# Patient Record
Sex: FEMALE | Race: BLACK OR AFRICAN AMERICAN | NOT HISPANIC OR LATINO | Employment: FULL TIME | URBAN - METROPOLITAN AREA
[De-identification: names, ages, dates, MRNs, and addresses within clinical notes are randomized per-mention and may not be internally consistent; named-entity substitution may affect disease eponyms.]

---

## 2022-01-27 ENCOUNTER — OFFICE VISIT (OUTPATIENT)
Dept: FAMILY MEDICINE | Facility: CLINIC | Age: 32
End: 2022-01-27
Payer: COMMERCIAL

## 2022-01-27 VITALS
DIASTOLIC BLOOD PRESSURE: 84 MMHG | BODY MASS INDEX: 31.02 KG/M2 | SYSTOLIC BLOOD PRESSURE: 114 MMHG | WEIGHT: 193 LBS | OXYGEN SATURATION: 99 % | HEART RATE: 75 BPM | HEIGHT: 66 IN

## 2022-01-27 DIAGNOSIS — E21.3 HYPERPARATHYROIDISM: ICD-10-CM

## 2022-01-27 DIAGNOSIS — D50.0 IRON DEFICIENCY ANEMIA DUE TO CHRONIC BLOOD LOSS: ICD-10-CM

## 2022-01-27 DIAGNOSIS — J45.40 MODERATE PERSISTENT ASTHMA WITHOUT COMPLICATION: Primary | ICD-10-CM

## 2022-01-27 PROBLEM — J45.20 MILD INTERMITTENT ASTHMA: Status: RESOLVED | Noted: 2022-01-27 | Resolved: 2022-01-27

## 2022-01-27 PROBLEM — D25.9 UTERINE LEIOMYOMA: Status: ACTIVE | Noted: 2022-01-27

## 2022-01-27 PROBLEM — E55.9 VITAMIN D DEFICIENCY: Status: ACTIVE | Noted: 2022-01-27

## 2022-01-27 PROBLEM — J45.20 MILD INTERMITTENT ASTHMA: Status: ACTIVE | Noted: 2022-01-27

## 2022-01-27 PROBLEM — K59.01 SLOW TRANSIT CONSTIPATION: Status: ACTIVE | Noted: 2022-01-27

## 2022-01-27 PROBLEM — N92.6 MENSTRUAL DISORDER: Status: ACTIVE | Noted: 2022-01-27

## 2022-01-27 PROCEDURE — 3074F PR MOST RECENT SYSTOLIC BLOOD PRESSURE < 130 MM HG: ICD-10-PCS | Mod: S$GLB,,, | Performed by: FAMILY MEDICINE

## 2022-01-27 PROCEDURE — 99203 OFFICE O/P NEW LOW 30 MIN: CPT | Mod: S$GLB,,, | Performed by: FAMILY MEDICINE

## 2022-01-27 PROCEDURE — 3074F SYST BP LT 130 MM HG: CPT | Mod: S$GLB,,, | Performed by: FAMILY MEDICINE

## 2022-01-27 PROCEDURE — 3079F DIAST BP 80-89 MM HG: CPT | Mod: S$GLB,,, | Performed by: FAMILY MEDICINE

## 2022-01-27 PROCEDURE — 3008F BODY MASS INDEX DOCD: CPT | Mod: S$GLB,,, | Performed by: FAMILY MEDICINE

## 2022-01-27 PROCEDURE — 99203 PR OFFICE/OUTPT VISIT, NEW, LEVL III, 30-44 MIN: ICD-10-PCS | Mod: S$GLB,,, | Performed by: FAMILY MEDICINE

## 2022-01-27 PROCEDURE — 3008F PR BODY MASS INDEX (BMI) DOCUMENTED: ICD-10-PCS | Mod: S$GLB,,, | Performed by: FAMILY MEDICINE

## 2022-01-27 PROCEDURE — 3079F PR MOST RECENT DIASTOLIC BLOOD PRESSURE 80-89 MM HG: ICD-10-PCS | Mod: S$GLB,,, | Performed by: FAMILY MEDICINE

## 2022-01-27 RX ORDER — ALBUTEROL SULFATE 90 UG/1
2 AEROSOL, METERED RESPIRATORY (INHALATION) EVERY 6 HOURS PRN
COMMUNITY
End: 2022-01-27 | Stop reason: SDUPTHER

## 2022-01-27 RX ORDER — DOCUSATE SODIUM 100 MG/1
100 CAPSULE, LIQUID FILLED ORAL 2 TIMES DAILY
COMMUNITY
End: 2023-03-13

## 2022-01-27 RX ORDER — FLUTICASONE PROPIONATE 220 UG/1
2 AEROSOL, METERED RESPIRATORY (INHALATION) 2 TIMES DAILY
Qty: 12 G | Refills: 5 | Status: SHIPPED | OUTPATIENT
Start: 2022-01-27 | End: 2023-07-21 | Stop reason: SDUPTHER

## 2022-01-27 RX ORDER — ALBUTEROL SULFATE 1.25 MG/3ML
1.25 SOLUTION RESPIRATORY (INHALATION) EVERY 6 HOURS PRN
COMMUNITY
End: 2023-08-17 | Stop reason: SDUPTHER

## 2022-01-27 RX ORDER — FLUTICASONE PROPIONATE 220 UG/1
AEROSOL, METERED RESPIRATORY (INHALATION)
COMMUNITY
Start: 2021-12-01 | End: 2022-01-27 | Stop reason: SDUPTHER

## 2022-01-27 RX ORDER — IBUPROFEN 800 MG/1
1 TABLET ORAL EVERY 6 HOURS
COMMUNITY
End: 2022-04-02

## 2022-01-27 RX ORDER — ALBUTEROL SULFATE 90 UG/1
2 AEROSOL, METERED RESPIRATORY (INHALATION) EVERY 6 HOURS PRN
Qty: 18 G | Refills: 5 | Status: SHIPPED | OUTPATIENT
Start: 2022-01-27 | End: 2023-07-21 | Stop reason: SDUPTHER

## 2022-01-27 NOTE — PROGRESS NOTES
SUBJECTIVE:    Patient ID: Monica Aparicio is a 32 y.o. female.    Chief Complaint: Establish Care and needs endocrine referral for surgery    New patient for me today.  She presents to establish care with a new primary care physician.  She has a history of hyper parathyroidism and requires referral to Endocrinology for further management.  She has been told she require surgery.  She has history of problems with fibroids and is status post uterine myomectomy in 2021. She tolerated this without any issues.  History of asthma and seasonal allergies.  She uses her medications as directed and does not report any significant asthma flare ups.  She has a history of anemia most likely room related to heavy uterine bleeding but admits to not taking her iron.  She has issues with IBS with constipation so she tends to avoid the medication.  She does use Colace for help with her constipation symptoms.       Past Medical History:   Diagnosis Date    Anemia     Asthma     Diverticulitis     Hypercalcemia     Internal hemorrhoid     Spinal stenosis      Past Surgical History:   Procedure Laterality Date    PLEURODESIS USING TALC  2007    UTERINE FIBROID SURGERY  07/31/2021     Family History   Problem Relation Age of Onset    Hypertension Mother     COPD Mother     Liver cancer Mother     Diabetes Mellitus Father        Marital Status: Single  Alcohol History:  reports current alcohol use.  Tobacco History:  reports that she has never smoked. She has never used smokeless tobacco.  Drug History:  reports no history of drug use.    Review of patient's allergies indicates:   Allergen Reactions    Tramadol Hives, Nausea And Vomiting, Rash and Shortness Of Breath    Pork/porcine containing products Diarrhea, Itching and Rash       Current Outpatient Medications:     albuterol (ACCUNEB) 1.25 mg/3 mL Nebu, Take 1.25 mg by nebulization every 6 (six) hours as needed. Rescue, Disp: , Rfl:     docusate sodium (COLACE) 100 MG  "capsule, Take 100 mg by mouth 2 (two) times daily., Disp: , Rfl:     ibuprofen (ADVIL,MOTRIN) 800 MG tablet, Take 1 tablet by mouth every 6 (six) hours., Disp: , Rfl:     albuterol (PROAIR HFA) 90 mcg/actuation inhaler, Inhale 2 puffs into the lungs every 6 (six) hours as needed for Wheezing. Rescue, Disp: 18 g, Rfl: 5    FLOVENT  mcg/actuation inhaler, Inhale 2 puffs into the lungs 2 (two) times a day., Disp: 12 g, Rfl: 5    pedi multivit 43-iron fumarate (FLINTSTONES COMPLETE, IRON,) 18 mg iron Chew, Take 1 tablet by mouth Daily., Disp: , Rfl:     Review of Systems   Constitutional: Negative for activity change, fatigue and unexpected weight change.   HENT: Negative for hearing loss, postnasal drip, sinus pressure, sore throat and voice change.    Eyes: Negative for photophobia and visual disturbance.   Respiratory: Negative for cough, shortness of breath and wheezing.    Cardiovascular: Negative for chest pain and palpitations.   Gastrointestinal: Positive for constipation. Negative for diarrhea and nausea.   Genitourinary: Negative for difficulty urinating, frequency, hematuria and urgency.   Musculoskeletal: Negative for arthralgias and back pain.   Skin: Negative for rash.   Neurological: Negative for weakness, light-headedness and headaches.   Hematological: Negative for adenopathy. Does not bruise/bleed easily.   Psychiatric/Behavioral: The patient is not nervous/anxious.           Objective:      Vitals:    01/27/22 1153   BP: 114/84   Pulse: 75   SpO2: 99%   Weight: 87.5 kg (193 lb)   Height: 5' 6" (1.676 m)     Physical Exam  Vitals reviewed.   Constitutional:       General: She is not in acute distress.     Appearance: Normal appearance. She is well-developed. She is obese.   HENT:      Head: Normocephalic and atraumatic.      Right Ear: External ear normal.      Left Ear: External ear normal.      Nose: Nose normal.      Mouth/Throat:      Mouth: Mucous membranes are moist.   Eyes:      " General: Lids are normal.      Conjunctiva/sclera: Conjunctivae normal.      Pupils: Pupils are equal, round, and reactive to light.   Neck:      Thyroid: No thyromegaly.      Vascular: No JVD.      Trachea: No tracheal deviation.   Cardiovascular:      Rate and Rhythm: Normal rate and regular rhythm.      Chest Wall: PMI is not displaced.      Pulses: Normal pulses.      Heart sounds: Normal heart sounds.   Pulmonary:      Effort: Pulmonary effort is normal.      Breath sounds: Normal breath sounds.   Abdominal:      General: Bowel sounds are normal.      Palpations: Abdomen is soft.      Tenderness: There is no abdominal tenderness. There is no guarding or rebound.   Musculoskeletal:         General: No tenderness. Normal range of motion.      Cervical back: Full passive range of motion without pain and neck supple.   Skin:     General: Skin is warm and dry.      Findings: No rash.   Neurological:      General: No focal deficit present.      Mental Status: She is alert and oriented to person, place, and time.   Psychiatric:         Mood and Affect: Mood normal.         Behavior: Behavior normal.           Assessment:       1. Moderate persistent asthma without complication    2. Hyperparathyroidism    3. Iron deficiency anemia due to chronic blood loss         Plan:       Moderate persistent asthma without complication  -     albuterol (PROAIR HFA) 90 mcg/actuation inhaler; Inhale 2 puffs into the lungs every 6 (six) hours as needed for Wheezing. Rescue  Dispense: 18 g; Refill: 5  -     FLOVENT  mcg/actuation inhaler; Inhale 2 puffs into the lungs 2 (two) times a day.  Dispense: 12 g; Refill: 5    Hyperparathyroidism  -     Ambulatory referral/consult to Endocrinology; Future; Expected date: 02/03/2022    Iron deficiency anemia due to chronic blood loss  -     CBC Auto Differential; Future; Expected date: 01/27/2022  -     Iron; Future; Expected date: 01/27/2022  -     pedi multivit 43-iron fumarate  (MARLY COMPLETE, IRON,) 18 mg iron Chew; Take 1 tablet by mouth Daily.      Follow up in about 6 months (around 7/27/2022) for anemia.

## 2022-01-28 LAB
BASOPHILS # BLD AUTO: 79 CELLS/UL (ref 0–200)
BASOPHILS NFR BLD AUTO: 1.3 %
EOSINOPHIL # BLD AUTO: 122 CELLS/UL (ref 15–500)
EOSINOPHIL NFR BLD AUTO: 2 %
ERYTHROCYTE [DISTWIDTH] IN BLOOD BY AUTOMATED COUNT: 14.4 % (ref 11–15)
HCT VFR BLD AUTO: 36.1 % (ref 35–45)
HGB BLD-MCNC: 11.9 G/DL (ref 11.7–15.5)
IRON SERPL-MCNC: 62 MCG/DL (ref 40–190)
LYMPHOCYTES # BLD AUTO: 2312 CELLS/UL (ref 850–3900)
LYMPHOCYTES NFR BLD AUTO: 37.9 %
MCH RBC QN AUTO: 28.5 PG (ref 27–33)
MCHC RBC AUTO-ENTMCNC: 33 G/DL (ref 32–36)
MCV RBC AUTO: 86.6 FL (ref 80–100)
MONOCYTES # BLD AUTO: 689 CELLS/UL (ref 200–950)
MONOCYTES NFR BLD AUTO: 11.3 %
NEUTROPHILS # BLD AUTO: 2898 CELLS/UL (ref 1500–7800)
NEUTROPHILS NFR BLD AUTO: 47.5 %
PLATELET # BLD AUTO: 270 THOUSAND/UL (ref 140–400)
PMV BLD REES-ECKER: 11.8 FL (ref 7.5–12.5)
RBC # BLD AUTO: 4.17 MILLION/UL (ref 3.8–5.1)
WBC # BLD AUTO: 6.1 THOUSAND/UL (ref 3.8–10.8)

## 2022-04-04 ENCOUNTER — TELEPHONE (OUTPATIENT)
Dept: FAMILY MEDICINE | Facility: CLINIC | Age: 32
End: 2022-04-04
Payer: COMMERCIAL

## 2022-04-04 NOTE — TELEPHONE ENCOUNTER
----- Message from Carmen Thrasher sent at 4/4/2022  8:45 AM CDT -----  Pt calling to schedule a HFU discharged 4/2  # [phone]

## 2022-04-07 ENCOUNTER — OFFICE VISIT (OUTPATIENT)
Dept: FAMILY MEDICINE | Facility: CLINIC | Age: 32
End: 2022-04-07
Payer: COMMERCIAL

## 2022-04-07 VITALS
SYSTOLIC BLOOD PRESSURE: 118 MMHG | OXYGEN SATURATION: 98 % | DIASTOLIC BLOOD PRESSURE: 76 MMHG | WEIGHT: 190 LBS | HEART RATE: 76 BPM | BODY MASS INDEX: 30.53 KG/M2 | HEIGHT: 66 IN

## 2022-04-07 DIAGNOSIS — F43.21 SITUATIONAL DEPRESSION: ICD-10-CM

## 2022-04-07 DIAGNOSIS — N92.1 MENOMETRORRHAGIA: Primary | ICD-10-CM

## 2022-04-07 PROCEDURE — 1160F PR REVIEW ALL MEDS BY PRESCRIBER/CLIN PHARMACIST DOCUMENTED: ICD-10-PCS | Mod: S$GLB,,, | Performed by: FAMILY MEDICINE

## 2022-04-07 PROCEDURE — 3078F PR MOST RECENT DIASTOLIC BLOOD PRESSURE < 80 MM HG: ICD-10-PCS | Mod: S$GLB,,, | Performed by: FAMILY MEDICINE

## 2022-04-07 PROCEDURE — 3008F BODY MASS INDEX DOCD: CPT | Mod: S$GLB,,, | Performed by: FAMILY MEDICINE

## 2022-04-07 PROCEDURE — 99213 OFFICE O/P EST LOW 20 MIN: CPT | Mod: S$GLB,,, | Performed by: FAMILY MEDICINE

## 2022-04-07 PROCEDURE — 3078F DIAST BP <80 MM HG: CPT | Mod: S$GLB,,, | Performed by: FAMILY MEDICINE

## 2022-04-07 PROCEDURE — 3074F SYST BP LT 130 MM HG: CPT | Mod: S$GLB,,, | Performed by: FAMILY MEDICINE

## 2022-04-07 PROCEDURE — 3008F PR BODY MASS INDEX (BMI) DOCUMENTED: ICD-10-PCS | Mod: S$GLB,,, | Performed by: FAMILY MEDICINE

## 2022-04-07 PROCEDURE — 3074F PR MOST RECENT SYSTOLIC BLOOD PRESSURE < 130 MM HG: ICD-10-PCS | Mod: S$GLB,,, | Performed by: FAMILY MEDICINE

## 2022-04-07 PROCEDURE — 1160F RVW MEDS BY RX/DR IN RCRD: CPT | Mod: S$GLB,,, | Performed by: FAMILY MEDICINE

## 2022-04-07 PROCEDURE — 99213 PR OFFICE/OUTPT VISIT, EST, LEVL III, 20-29 MIN: ICD-10-PCS | Mod: S$GLB,,, | Performed by: FAMILY MEDICINE

## 2022-04-07 NOTE — PROGRESS NOTES
SUBJECTIVE:    Patient ID: Monica Aparicio is a 32 y.o. female.    Chief Complaint: Follow-up (ER, went over meds verbally// SW)    Patient seen in the ER 4/2/22 for heavy vaginal bleeding. She has h/o fibroids and myomectomy. Was told to go to the ER due to possible miscarriage.  She had been sexually active a few weeks prior.  She is not sexually active often as she has chronic issues with vaginismus.  Beta-hCG was very low demonstrating a possible early pregnancy.  Ob consult with did not feel an ultrasound as necessary as beta hCG was around 10.   The patient is dealing with some emotional discord follow the situation.  Secondary to her fibroids and vaginismus she given upon ever being pregnant.  This potential miscarriage has affected her for more than she has anticipated.      Office Visit on 04/07/2022   Component Date Value Ref Range Status    HCG, Quantitative 04/08/2022 <3  mIU/mL Final   Admission on 04/02/2022, Discharged on 04/02/2022   Component Date Value Ref Range Status    POC Sodium 04/02/2022 145  128 - 145 mmol/L Final    POC Potassium 04/02/2022 4.1  3.6 - 5.1 mmol/L Final    POC Chloride 04/02/2022 108  98 - 108 mmol/L Final    POC CO2 04/02/2022 28  18 - 33 mmol/L Final    POC Glucose 04/02/2022 80  73 - 118 mg/dL Final    POC BUN 04/02/2022 8  7 - 22 mg/dL Final    POC Creatinine 04/02/2022 0.6  0.6 - 1.2 mg/dL Final    Calcium, POC 04/02/2022 11.2 (A) 8.0 - 10.3 mg/dL Final    Albumin, POC 04/02/2022 3.9  3.3 - 5.5 g/dL Final    POC ALT 04/02/2022 15  10 - 47 U/L Final    POC AST 04/02/2022 25  11 - 38 U/L Final    Alkaline Phosphatase, POC 04/02/2022 76  42 - 141 U/L Final    POC TOTAL PROTEIN 04/02/2022 7.7  6.4 - 8.1 g/dL Final    POC TOTAL BILIRUBIN 04/02/2022 1.1  0.2 - 1.6 mg/dL Final    POC eGFR 04/02/2022 >60  61 - 2,000 mL/min Final    POC Hemolysis 04/02/2022 1+   Final    Specimen Type 04/02/2022 BLNK   Final    POC WBC 04/02/2022 5.9  3.9 - 12.7 K/uL Final     POC GRA% 04/02/2022 58.6  38.0 - 73.0 % Final    POC MID% 04/02/2022 8.1  4.0 - 15.0 % Final    POC LYM% 04/02/2022 33.3  18.0 - 48.0 % Final    POC Gran# 04/02/2022 3.4  1.8 - 7.7 K/uL Final    POC MID# 04/02/2022 0.6  0.3 - 1.0 K/uL Final    POC LYM# 04/02/2022 1.9  1.0 - 4.8 K/uL Final    POC RBC 04/02/2022 5.02  4.00 - 5.40 M/uL Final    POC HGB 04/02/2022 14.2  12.0 - 16.0 g/dL Final    POC MCV 04/02/2022 83.9  82.0 - 98.0 fl Final    POC HCT 04/02/2022 42.1  37.0 - 48.5 % Final    POC MCH 04/02/2022 28.3  27.0 - 31.0 pg Final    POC MCHC 04/02/2022 33.7  32.0 - 36.0 g/dL Final    POC RDW% 04/02/2022 13.3  11.5 - 14.5 % Final    POC PLT 04/02/2022 247  150 - 450 K/uL Final    POC MPV 04/02/2022 9.5  9.2 - 12.9 fl Final    POC Preg Test, Ur 04/02/2022 Negative  Negative Final     Acceptable 04/02/2022 Yes   Final    POC Beta HCG 04/02/2022 10.0 (A) 0.0 - 4.9 IU/L Final   Office Visit on 01/27/2022   Component Date Value Ref Range Status    WBC 01/27/2022 6.1  3.8 - 10.8 Thousand/uL Final    RBC 01/27/2022 4.17  3.80 - 5.10 Million/uL Final    Hemoglobin 01/27/2022 11.9  11.7 - 15.5 g/dL Final    Hematocrit 01/27/2022 36.1  35.0 - 45.0 % Final    MCV 01/27/2022 86.6  80.0 - 100.0 fL Final    MCH 01/27/2022 28.5  27.0 - 33.0 pg Final    MCHC 01/27/2022 33.0  32.0 - 36.0 g/dL Final    RDW 01/27/2022 14.4  11.0 - 15.0 % Final    Platelets 01/27/2022 270  140 - 400 Thousand/uL Final    MPV 01/27/2022 11.8  7.5 - 12.5 fL Final    Neutrophils, Abs 01/27/2022 2,898  1,500 - 7,800 cells/uL Final    Lymph # 01/27/2022 2,312  850 - 3,900 cells/uL Final    Mono # 01/27/2022 689  200 - 950 cells/uL Final    Eos # 01/27/2022 122  15 - 500 cells/uL Final    Baso # 01/27/2022 79  0 - 200 cells/uL Final    Neutrophils Relative 01/27/2022 47.5  % Final    Lymph % 01/27/2022 37.9  % Final    Mono % 01/27/2022 11.3  % Final    Eosinophil % 01/27/2022 2.0  % Final    Basophil %  "01/27/2022 1.3  % Final    Iron 01/27/2022 62  40 - 190 mcg/dL Final     Past Medical History:   Diagnosis Date    Anemia     Asthma     Diverticulitis     Hypercalcemia     Internal hemorrhoid     Spinal stenosis      Past Surgical History:   Procedure Laterality Date    PLEURODESIS USING TALC  2007    UTERINE FIBROID SURGERY  07/31/2021     Family History   Problem Relation Age of Onset    Hypertension Mother     COPD Mother     Liver cancer Mother     Diabetes Mellitus Father        Marital Status: Single  Alcohol History:  reports current alcohol use.  Tobacco History:  reports that she has never smoked. She has never used smokeless tobacco.  Drug History:  reports no history of drug use.    Review of patient's allergies indicates:   Allergen Reactions    Tramadol Hives, Nausea And Vomiting, Rash and Shortness Of Breath    Pork/porcine containing products Diarrhea, Itching and Rash       Current Outpatient Medications:     albuterol (ACCUNEB) 1.25 mg/3 mL Nebu, Take 1.25 mg by nebulization every 6 (six) hours as needed. Rescue, Disp: , Rfl:     albuterol (PROAIR HFA) 90 mcg/actuation inhaler, Inhale 2 puffs into the lungs every 6 (six) hours as needed for Wheezing. Rescue, Disp: 18 g, Rfl: 5    docusate sodium (COLACE) 100 MG capsule, Take 100 mg by mouth 2 (two) times daily., Disp: , Rfl:     FLOVENT  mcg/actuation inhaler, Inhale 2 puffs into the lungs 2 (two) times a day., Disp: 12 g, Rfl: 5    pedi multivit 43-iron fumarate (FLINTSTONES COMPLETE, IRON,) 18 mg iron Chew, Take 1 tablet by mouth Daily., Disp: , Rfl:     Review of Systems   Genitourinary: Positive for menstrual problem.   Psychiatric/Behavioral: Positive for dysphoric mood.          Objective:      Vitals:    04/07/22 1643   BP: 118/76   Pulse: 76   SpO2: 98%   Weight: 86.2 kg (190 lb)   Height: 5' 6" (1.676 m)     Physical Exam  Constitutional:       Appearance: Normal appearance.   HENT:      Head: Normocephalic and " Physical Exam: VS reviewed. Pt is well appearing, in no respiratory distress. MMM. Cap refill <2 seconds. TMs normal b/l, no erythema, no dullness, no hemotympanum. Eyes normal with no injection, no discharge, EOMI.  Pharynx with no erythema, no exudates, no stomatitis. No anterior cervical lymph nodes appreciated. No skin rash noted. Chest is clear, no wheezing, rales or crackles. No retractions, no distress. Normal and equal breath sounds. Normal heart sounds, no muffling, no murmur appreciated. Abdomen soft, NT/ND, no guarding, no localized tenderness.  Neuro exam grossly intact. Normal male , circumcised male, testes descended BL. atraumatic.      Mouth/Throat:      Mouth: Mucous membranes are moist.   Eyes:      Conjunctiva/sclera: Conjunctivae normal.   Pulmonary:      Effort: Pulmonary effort is normal.   Neurological:      General: No focal deficit present.      Mental Status: She is alert and oriented to person, place, and time.   Psychiatric:         Mood and Affect: Mood is depressed.         Behavior: Behavior normal.           Assessment:       1. Menometrorrhagia    2. Situational depression         Plan:       Menometrorrhagia  -     HCG, Quantitative; Future; Expected date: 04/07/2022    Situational depression    Labs to be repeated.  Discussion with patient that this was most likely a molar pregnancy in very well nonviable.  Counseling recommended if desired  .  Follow up if symptoms worsen or fail to improve.               see md note

## 2022-04-07 NOTE — PATIENT INSTRUCTIONS
Richardson Coronado  Physician    Provider Summary    Sex Age Title   Male 43 y.o. DO   Provider type     Physician       Primary Contact Information    Phone Fax E-mail Address   [phone] Not available [address] [place] [address]       Languages Specialties     English Endocrinology           Probable molar pregnacy

## 2022-04-09 LAB — B-HCG SERPL-ACNC: <3 MIU/ML

## 2022-05-04 ENCOUNTER — PATIENT MESSAGE (OUTPATIENT)
Dept: FAMILY MEDICINE | Facility: CLINIC | Age: 32
End: 2022-05-04

## 2022-05-20 ENCOUNTER — HOSPITAL ENCOUNTER (EMERGENCY)
Facility: HOSPITAL | Age: 32
Discharge: HOME | End: 2022-05-20
Attending: EMERGENCY MEDICINE
Payer: COMMERCIAL

## 2022-05-20 VITALS
BODY MASS INDEX: 31.5 KG/M2 | HEIGHT: 66 IN | HEART RATE: 72 BPM | OXYGEN SATURATION: 99 % | WEIGHT: 196 LBS | DIASTOLIC BLOOD PRESSURE: 63 MMHG | TEMPERATURE: 98 F | SYSTOLIC BLOOD PRESSURE: 130 MMHG | RESPIRATION RATE: 18 BRPM
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
## 2022-05-20 DIAGNOSIS — J45.901 EXACERBATION OF ASTHMA, UNSPECIFIED ASTHMA SEVERITY, UNSPECIFIED WHETHER PERSISTENT: ICD-10-CM

## 2022-05-20 DIAGNOSIS — S16.1XXA STRAIN OF NECK MUSCLE, INITIAL ENCOUNTER: ICD-10-CM

## 2022-05-20 PROCEDURE — 94761 N-INVAS EAR/PLS OXIMETRY MLT: CPT

## 2022-05-20 PROCEDURE — 99284 EMERGENCY DEPT VISIT MOD MDM: CPT | Mod: 25

## 2022-05-20 PROCEDURE — 94640 AIRWAY INHALATION TREATMENT: CPT

## 2022-05-20 PROCEDURE — 25000242 PHARM REV CODE 250 ALT 637 W/ HCPCS: Performed by: EMERGENCY MEDICINE

## 2022-05-20 PROCEDURE — 63600175 PHARM REV CODE 636 W HCPCS: Performed by: EMERGENCY MEDICINE

## 2022-05-20 PROCEDURE — 25000003 PHARM REV CODE 250: Performed by: EMERGENCY MEDICINE

## 2022-05-20 RX ORDER — IPRATROPIUM BROMIDE AND ALBUTEROL SULFATE 2.5; .5 MG/3ML; MG/3ML
3 SOLUTION RESPIRATORY (INHALATION)
Status: COMPLETED | OUTPATIENT
Start: 2022-05-20 | End: 2022-05-20

## 2022-05-20 RX ORDER — HYDROCODONE BITARTRATE AND ACETAMINOPHEN 5; 325 MG/1; MG/1
1 TABLET ORAL EVERY 8 HOURS PRN
Qty: 9 TABLET | Refills: 0 | Status: SHIPPED | OUTPATIENT
Start: 2022-05-20 | End: 2022-08-26

## 2022-05-20 RX ORDER — PREDNISONE 20 MG/1
40 TABLET ORAL DAILY
Qty: 8 TABLET | Refills: 0 | Status: SHIPPED | OUTPATIENT
Start: 2022-05-21 | End: 2022-05-25

## 2022-05-20 RX ORDER — IBUPROFEN 600 MG/1
600 TABLET ORAL EVERY 6 HOURS PRN
Qty: 20 TABLET | Refills: 0 | Status: SHIPPED | OUTPATIENT
Start: 2022-05-20 | End: 2023-03-13

## 2022-05-20 RX ORDER — PREDNISONE 20 MG/1
40 TABLET ORAL
Status: COMPLETED | OUTPATIENT
Start: 2022-05-20 | End: 2022-05-20

## 2022-05-20 RX ORDER — HYDROCODONE BITARTRATE AND ACETAMINOPHEN 5; 325 MG/1; MG/1
1 TABLET ORAL
Status: COMPLETED | OUTPATIENT
Start: 2022-05-20 | End: 2022-05-20

## 2022-05-20 RX ADMIN — PREDNISONE 40 MG: 20 TABLET ORAL at 04:05

## 2022-05-20 RX ADMIN — IPRATROPIUM BROMIDE AND ALBUTEROL SULFATE 3 ML: 2.5; .5 SOLUTION RESPIRATORY (INHALATION) at 06:05

## 2022-05-20 RX ADMIN — HYDROCODONE BITARTRATE AND ACETAMINOPHEN 1 TABLET: 5; 325 TABLET ORAL at 04:05
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
## 2022-05-20 NOTE — Clinical Note
"Monica"Dory Aparicio was seen and treated in our emergency department on 5/20/2022.  She may return to work on 05/23/2022.  Please allow patient time to follow up with primary care and further specialties if pain persist. If patient unable to follow up with primary care, please allow patient more time off until able to follow up.      If you have any questions or concerns, please don't hesitate to call.       RN    "
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
## 2022-05-20 NOTE — DISCHARGE INSTRUCTIONS
Use ibuprofen as needed for pain.  Use Norco as needed for severe pain I will provide ibuprofen.  Complete the course of prednisone prescribed for asthma.  Use albuterol inhaler nebulizations as needed.  Schedule close follow-up with your primary physician.  Return to the emergency department for any new, worsening or significantly concerning symptoms.    Thank you for coming to our Emergency Department today. It is important to remember that some problems are difficult to diagnose and may not be found during your first visit. Be sure to follow up with your primary care doctor and review any labs/imaging that was performed with them. If you do not have a primary care doctor, you may contact the one listed on your discharge paperwork or you may also call the [facility] Appointment Desk at [phone] to schedule an appointment with one.     All medications may potentially have side effects and it is impossible to predict which medications may give you side effects. If you feel that you are having a negative effect of any medication you should immediately stop taking them and seek medical attention.    Return to the ER with any questions/concerns, new/concerning symptoms, worsening or failure to improve. Do not drive or make any important decisions for 24 hours if you have received any pain medications, sedatives or mood altering drugs during your ER visit.

## 2022-05-22 ENCOUNTER — PATIENT MESSAGE (OUTPATIENT)
Dept: FAMILY MEDICINE | Facility: CLINIC | Age: 32
End: 2022-05-22

## 2022-05-23 ENCOUNTER — PATIENT MESSAGE (OUTPATIENT)
Dept: FAMILY MEDICINE | Facility: CLINIC | Age: 32
End: 2022-05-23

## 2022-05-25 ENCOUNTER — TELEPHONE (OUTPATIENT)
Dept: FAMILY MEDICINE | Facility: CLINIC | Age: 32
End: 2022-05-25
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
## 2022-05-30 ENCOUNTER — TELEPHONE (OUTPATIENT)
Dept: FAMILY MEDICINE | Facility: CLINIC | Age: 32
End: 2022-05-30
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
## 2022-06-02 ENCOUNTER — TELEPHONE (OUTPATIENT)
Dept: EMERGENCY MEDICINE | Facility: HOSPITAL | Age: 32
End: 2022-06-02
Payer: COMMERCIAL
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
## 2022-06-08 ENCOUNTER — TELEPHONE (OUTPATIENT)
Dept: ORTHOPEDICS | Facility: CLINIC | Age: 32
End: 2022-06-08
Payer: COMMERCIAL

## 2022-06-08 NOTE — TELEPHONE ENCOUNTER
Attempt to contact patient in regards to scheduling an appointment. Unable to leave message. Voice mail is full. Thanks.

## 2022-06-10 ENCOUNTER — TELEPHONE (OUTPATIENT)
Dept: URGENT CARE | Facility: CLINIC | Age: 32
End: 2022-06-10
Payer: COMMERCIAL

## 2022-06-10 ENCOUNTER — OFFICE VISIT (OUTPATIENT)
Dept: URGENT CARE | Facility: CLINIC | Age: 32
End: 2022-06-10
Payer: COMMERCIAL

## 2022-06-10 VITALS
OXYGEN SATURATION: 92 % | HEART RATE: 85 BPM | WEIGHT: 180 LBS | RESPIRATION RATE: 16 BRPM | DIASTOLIC BLOOD PRESSURE: 83 MMHG | HEIGHT: 66 IN | BODY MASS INDEX: 28.93 KG/M2 | SYSTOLIC BLOOD PRESSURE: 137 MMHG

## 2022-06-10 DIAGNOSIS — M62.838 MUSCLE SPASMS OF NECK: ICD-10-CM

## 2022-06-10 DIAGNOSIS — Z87.39 HISTORY OF SPINAL STENOSIS: ICD-10-CM
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
## 2022-06-10 DIAGNOSIS — S16.1XXA CERVICAL STRAIN, ACUTE, INITIAL ENCOUNTER: Primary | ICD-10-CM

## 2022-06-10 PROCEDURE — 99203 PR OFFICE/OUTPT VISIT, NEW, LEVL III, 30-44 MIN: ICD-10-PCS | Mod: S$GLB,,, | Performed by: EMERGENCY MEDICINE

## 2022-06-10 PROCEDURE — 99203 OFFICE O/P NEW LOW 30 MIN: CPT | Mod: S$GLB,,, | Performed by: EMERGENCY MEDICINE

## 2022-06-10 RX ORDER — METHOCARBAMOL 500 MG/1
1000 TABLET, FILM COATED ORAL 3 TIMES DAILY
Qty: 60 TABLET | Refills: 0 | Status: SHIPPED | OUTPATIENT
Start: 2022-06-10 | End: 2022-06-20

## 2022-06-10 RX ORDER — IBUPROFEN 600 MG/1
600 TABLET ORAL EVERY 6 HOURS PRN
Qty: 40 TABLET | Refills: 0 | Status: SHIPPED | OUTPATIENT
Start: 2022-06-10 | End: 2022-06-20
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
## 2022-06-17 ENCOUNTER — OFFICE VISIT (OUTPATIENT)
Dept: URGENT CARE | Facility: CLINIC | Age: 32
End: 2022-06-17
Payer: COMMERCIAL

## 2022-06-17 VITALS
SYSTOLIC BLOOD PRESSURE: 126 MMHG | TEMPERATURE: 97 F | RESPIRATION RATE: 16 BRPM | HEART RATE: 70 BPM | DIASTOLIC BLOOD PRESSURE: 84 MMHG | OXYGEN SATURATION: 98 %

## 2022-06-17 DIAGNOSIS — S16.1XXD STRAIN OF NECK MUSCLE, SUBSEQUENT ENCOUNTER: Primary | ICD-10-CM

## 2022-06-17 DIAGNOSIS — M62.838 MUSCLE SPASMS OF NECK: ICD-10-CM
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
## 2022-06-17 DIAGNOSIS — Z87.39 HISTORY OF SPINAL STENOSIS: ICD-10-CM

## 2022-06-17 PROCEDURE — 99213 PR OFFICE/OUTPT VISIT, EST, LEVL III, 20-29 MIN: ICD-10-PCS | Mod: S$GLB,,, | Performed by: NURSE PRACTITIONER

## 2022-06-17 PROCEDURE — 99213 OFFICE O/P EST LOW 20 MIN: CPT | Mod: S$GLB,,, | Performed by: NURSE PRACTITIONER

## 2022-06-17 RX ORDER — LIDOCAINE 50 MG/G
1 PATCH TOPICAL DAILY
Qty: 10 PATCH | Refills: 0 | Status: SHIPPED | OUTPATIENT
Start: 2022-06-17 | End: 2022-06-27
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
## 2022-06-21 ENCOUNTER — OFFICE VISIT (OUTPATIENT)
Dept: URGENT CARE | Facility: CLINIC | Age: 32
End: 2022-06-21
Payer: COMMERCIAL
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
## 2022-06-21 DIAGNOSIS — Z87.39 HISTORY OF SPINAL STENOSIS: ICD-10-CM

## 2022-06-21 DIAGNOSIS — S16.1XXD STRAIN OF NECK MUSCLE, SUBSEQUENT ENCOUNTER: ICD-10-CM

## 2022-06-21 PROCEDURE — 99214 OFFICE O/P EST MOD 30 MIN: CPT | Mod: S$GLB,,, | Performed by: EMERGENCY MEDICINE

## 2022-06-21 PROCEDURE — 99214 PR OFFICE/OUTPT VISIT, EST, LEVL IV, 30-39 MIN: ICD-10-PCS | Mod: S$GLB,,, | Performed by: EMERGENCY MEDICINE

## 2022-06-21 RX ORDER — LIDOCAINE 50 MG/G
1 PATCH TOPICAL DAILY
Qty: 30 PATCH | Refills: 1 | Status: SHIPPED | OUTPATIENT
Start: 2022-06-21 | End: 2022-07-21
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
## 2022-06-22 ENCOUNTER — CLINICAL SUPPORT (OUTPATIENT)
Dept: REHABILITATION | Facility: HOSPITAL | Age: 32
End: 2022-06-22
Attending: EMERGENCY MEDICINE
Payer: COMMERCIAL

## 2022-06-22 DIAGNOSIS — M54.2 CERVICALGIA: ICD-10-CM

## 2022-06-22 PROCEDURE — 97162 PT EVAL MOD COMPLEX 30 MIN: CPT | Mod: PN

## 2022-06-22 PROCEDURE — 97110 THERAPEUTIC EXERCISES: CPT | Mod: PN

## 2022-06-22 NOTE — PATIENT INSTRUCTIONS
Access Code: [email or web address]  URL: [email or web address]  Date: 06/22/2022  Prepared by: Franco Rizo    Exercises  Supine Chin Tuck - 3 x daily - 7 x weekly - 2 sets - 10 reps  Supine Isometric Neck Extension - 3 x daily - 7 x weekly - 2 sets - 10 reps  Supine Cervical Rotation AROM on Pillow - 3 x daily - 7 x weekly - 2 sets - 10 reps  Seated Scapular Retraction - 3 x daily - 7 x weekly - 2 sets - 10 reps    Patient Education  What Is Pain?  Acute Neck Pain Handout  Managing Neck Pain  Ergonomics for Neck Discomfort  Introduction to Ergonomics
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
## 2022-06-24 ENCOUNTER — CLINICAL SUPPORT (OUTPATIENT)
Dept: REHABILITATION | Facility: HOSPITAL | Age: 32
End: 2022-06-24
Payer: COMMERCIAL

## 2022-06-24 ENCOUNTER — TELEPHONE (OUTPATIENT)
Dept: URGENT CARE | Facility: CLINIC | Age: 32
End: 2022-06-24
Payer: COMMERCIAL

## 2022-06-24 DIAGNOSIS — M54.2 CERVICALGIA: Primary | ICD-10-CM

## 2022-06-24 PROCEDURE — 97110 THERAPEUTIC EXERCISES: CPT | Mod: PN,CQ
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
## 2022-06-24 NOTE — TELEPHONE ENCOUNTER
Unable to leave message and call back number regarding [facility] appointment , patients mailbox is full. [employer]

## 2022-06-27 ENCOUNTER — CLINICAL SUPPORT (OUTPATIENT)
Dept: REHABILITATION | Facility: HOSPITAL | Age: 32
End: 2022-06-27
Attending: EMERGENCY MEDICINE
Payer: COMMERCIAL

## 2022-06-27 DIAGNOSIS — M54.2 CERVICALGIA: Primary | ICD-10-CM

## 2022-06-27 PROCEDURE — 97110 THERAPEUTIC EXERCISES: CPT | Mod: PN,CQ

## 2022-06-27 PROCEDURE — 97140 MANUAL THERAPY 1/> REGIONS: CPT | Mod: PN,CQ
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
## 2022-06-29 ENCOUNTER — CLINICAL SUPPORT (OUTPATIENT)
Dept: REHABILITATION | Facility: HOSPITAL | Age: 32
End: 2022-06-29
Attending: EMERGENCY MEDICINE
Payer: COMMERCIAL

## 2022-06-29 DIAGNOSIS — M54.2 CERVICALGIA: Primary | ICD-10-CM

## 2022-06-29 PROCEDURE — 97110 THERAPEUTIC EXERCISES: CPT | Mod: PN

## 2022-07-01 ENCOUNTER — CLINICAL SUPPORT (OUTPATIENT)
Dept: REHABILITATION | Facility: HOSPITAL | Age: 32
End: 2022-07-01
Attending: EMERGENCY MEDICINE
Payer: COMMERCIAL

## 2022-07-01 DIAGNOSIS — M54.2 CERVICALGIA: Primary | ICD-10-CM

## 2022-07-01 PROCEDURE — 97110 THERAPEUTIC EXERCISES: CPT | Mod: PN,CQ
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
## 2022-07-05 ENCOUNTER — OFFICE VISIT (OUTPATIENT)
Dept: URGENT CARE | Facility: CLINIC | Age: 32
End: 2022-07-05
Payer: COMMERCIAL

## 2022-07-05 ENCOUNTER — CLINICAL SUPPORT (OUTPATIENT)
Dept: REHABILITATION | Facility: HOSPITAL | Age: 32
End: 2022-07-05
Payer: COMMERCIAL
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
## 2022-07-05 DIAGNOSIS — Z02.6 ENCOUNTER RELATED TO WORKER'S COMPENSATION CLAIM: Primary | ICD-10-CM
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
## 2022-07-05 DIAGNOSIS — S16.1XXD STRAIN OF NECK MUSCLE, SUBSEQUENT ENCOUNTER: ICD-10-CM

## 2022-07-05 DIAGNOSIS — Z87.39 HISTORY OF SPINAL STENOSIS: ICD-10-CM

## 2022-07-05 DIAGNOSIS — M54.2 CERVICALGIA: Primary | ICD-10-CM

## 2022-07-05 PROCEDURE — 99214 OFFICE O/P EST MOD 30 MIN: CPT | Mod: S$GLB,,, | Performed by: PHYSICIAN ASSISTANT

## 2022-07-05 PROCEDURE — 99214 PR OFFICE/OUTPT VISIT, EST, LEVL IV, 30-39 MIN: ICD-10-PCS | Mod: S$GLB,,, | Performed by: PHYSICIAN ASSISTANT

## 2022-07-05 PROCEDURE — 97110 THERAPEUTIC EXERCISES: CPT | Mod: PN,CQ

## 2022-07-05 RX ORDER — ACETAMINOPHEN 500 MG
1000 TABLET ORAL EVERY 6 HOURS PRN
Qty: 60 TABLET | Refills: 0 | Status: SHIPPED | OUTPATIENT
Start: 2022-07-05 | End: 2022-08-26
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
## 2022-07-06 ENCOUNTER — CLINICAL SUPPORT (OUTPATIENT)
Dept: REHABILITATION | Facility: HOSPITAL | Age: 32
End: 2022-07-06
Attending: EMERGENCY MEDICINE
Payer: COMMERCIAL

## 2022-07-06 DIAGNOSIS — M54.2 CERVICALGIA: Primary | ICD-10-CM

## 2022-07-06 PROCEDURE — 97110 THERAPEUTIC EXERCISES: CPT | Mod: PN
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
## 2022-07-08 ENCOUNTER — CLINICAL SUPPORT (OUTPATIENT)
Dept: REHABILITATION | Facility: HOSPITAL | Age: 32
End: 2022-07-08
Attending: EMERGENCY MEDICINE
Payer: COMMERCIAL

## 2022-07-08 DIAGNOSIS — M54.2 CERVICALGIA: Primary | ICD-10-CM

## 2022-07-08 PROCEDURE — 97140 MANUAL THERAPY 1/> REGIONS: CPT | Mod: PN,CQ

## 2022-07-08 PROCEDURE — 97110 THERAPEUTIC EXERCISES: CPT | Mod: PN,CQ
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
## 2022-07-11 ENCOUNTER — CLINICAL SUPPORT (OUTPATIENT)
Dept: REHABILITATION | Facility: HOSPITAL | Age: 32
End: 2022-07-11
Attending: EMERGENCY MEDICINE
Payer: COMMERCIAL

## 2022-07-11 DIAGNOSIS — M54.2 CERVICALGIA: Primary | ICD-10-CM

## 2022-07-11 PROCEDURE — 97140 MANUAL THERAPY 1/> REGIONS: CPT | Mod: PN

## 2022-07-11 PROCEDURE — 97110 THERAPEUTIC EXERCISES: CPT | Mod: PN

## 2022-07-11 NOTE — PLAN OF CARE
"  Outpatient Therapy Updated Plan of Care     Visit Date: 7/11/2022  Name: Monica Aparicio  Clinic Number: [id number]    Therapy Diagnosis:   Encounter Diagnosis   Name Primary?    Cervicalgia Yes     Physician: Dwight Rodrigues MD       Evaluation Date: 6/22/2022  Authorization Period Expiration: 6/10/2023  Visit # / Visits authorized: 9/9    (# of No Show Appts 0/Number of Cancelled Appts: 0)      Total Visits Received: 9  Cancelled Visits: 0  No Show Visits: 0    Current Certification Period:  6/22/22 to 7/15/22  Precautions:  Standard  Visits from Evaluation Date:  8  Functional Level Prior to Evaluation:  Able to work full duty    Subjective     Pt reports: "Im here" when asked how she is doing today. States she has increased pain after last visit. STM to neck is painful but she thinks it might help.  She was compliant with home exercise program.  Response to previous treatment: HA with isometric retractions  Functional change: slight improvement in cervical mobility    Pain: 3 or 4/10  Location: cervical spine R and L through the upper T spine      Objective     Last visit  Pt to clinic with no pain patch in place guided by her sister. Pt sits with very rigid posture with elevated mm guarding behaviors, poor eye contact throughout session as well as slow/low/short phrases at times. Important to note pt is currently undergoing breathing treatments per her reports post COVID.   Based on observations, pt's cervical flexion and R/L rotation appear to be improving, shoulder A/AAROM appears to be slowly improving as well as the pt's willingness to move  Pt did report unable to  onto anything repeatedly (I.e. when attempting horizontal row) and has been this was since '03. Reports any time she does anything with prolonged or repeated gripping that her hands "do not work" well for about a week afterwards and then even takes about 3 days afterwards to get back to normal. Pt refused to perform activities involving " gripping due to these concerns. Discussed fear avoidance, disuse atrophy vs ability to  a cup to drink, towel to wash her body etc. Vs difference with exercise?      Today  Continues to enter clinic without pain patch and ambulates/sit with rigid posture and poor eye contact. Rigid posture still present at end of session improved and pt eye contact improved significantly by end of visit.     (*) = degrees currently    Flexion  30 burning in posterior C spine   Extension  20   R Rotation  50   L Rotation  15   Guarded cervicothoracic posture throughout ROM assessment      Assessment     Pt presents to clinic for 9/9 authorized visits. Re-assessment/Updated POC performed today. Pt met 1/7 goals since initial evaluation, meeting HEP goals. Cervical AROM still very limited but R sided rotation improved the most since IE. Pt is very guarded with AROM cervical spine and B shoulders.  strength NT as pt has requested to avoid gripping objects in therapy due to some long standing issues with her hands. Overall, pt demonstrating progress but slowly. Manual therapy performed towards end of session and good response with STM performed with wedge and 2 pillows under pt. She was able to tolerate mild to moderate pressure during STM and gentle cervical distraction. Pt entered visit with very rigid posture and poor eye contact and left with posture still guarded but improved eye contact. Pt will benefit from continued skilled PT to address goals not met and allow for full return to work.      Previous Short Term Goals Status:   No goals met  New Short Term Goals Status:   1/7 goals met  Long Term Goal Status:   continue per initial plan of care.  Reasons for Recertification of Therapy:   Maximum outcomes not yet achieved     Plan     Updated Certification Period: 7/11/2022 to 8/1/22  Recommended Treatment Plan: 3 times per week for 3 weeks: Cervical/Lumbar Traction, Manual Therapy, Moist Heat/ Ice, Neuromuscular Re-ed,  Patient Education, Self Care, Therapeutic Activities and Therapeutic Exercise      GHADA MERINO, PT  7/11/2022      I CERTIFY THE NEED FOR THESE SERVICES FURNISHED UNDER THIS PLAN OF TREATMENT AND WHILE UNDER MY CARE    Physician's comments:        Physician's Signature: ___________________________________________________
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
## 2022-07-18 ENCOUNTER — CLINICAL SUPPORT (OUTPATIENT)
Dept: REHABILITATION | Facility: HOSPITAL | Age: 32
End: 2022-07-18
Attending: PHYSICIAN ASSISTANT
Payer: COMMERCIAL

## 2022-07-18 DIAGNOSIS — M54.2 CERVICALGIA: Primary | ICD-10-CM

## 2022-07-18 PROCEDURE — 97110 THERAPEUTIC EXERCISES: CPT | Mod: PN

## 2022-07-18 PROCEDURE — 97140 MANUAL THERAPY 1/> REGIONS: CPT | Mod: PN
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
## 2022-07-19 ENCOUNTER — OFFICE VISIT (OUTPATIENT)
Dept: URGENT CARE | Facility: CLINIC | Age: 32
End: 2022-07-19
Payer: COMMERCIAL

## 2022-07-19 DIAGNOSIS — Z02.6 ENCOUNTER RELATED TO WORKER'S COMPENSATION CLAIM: Primary | ICD-10-CM

## 2022-07-19 DIAGNOSIS — Z87.39 HISTORY OF SPINAL STENOSIS: ICD-10-CM

## 2022-07-19 DIAGNOSIS — S16.1XXD STRAIN OF NECK MUSCLE, SUBSEQUENT ENCOUNTER: ICD-10-CM
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
## 2022-07-19 PROCEDURE — 99214 OFFICE O/P EST MOD 30 MIN: CPT | Mod: S$GLB,,, | Performed by: NURSE PRACTITIONER

## 2022-07-19 PROCEDURE — 99214 PR OFFICE/OUTPT VISIT, EST, LEVL IV, 30-39 MIN: ICD-10-PCS | Mod: S$GLB,,, | Performed by: NURSE PRACTITIONER
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
## 2022-07-20 ENCOUNTER — CLINICAL SUPPORT (OUTPATIENT)
Dept: REHABILITATION | Facility: HOSPITAL | Age: 32
End: 2022-07-20
Attending: PHYSICIAN ASSISTANT
Payer: COMMERCIAL

## 2022-07-20 DIAGNOSIS — M54.2 CERVICALGIA: Primary | ICD-10-CM

## 2022-07-20 PROCEDURE — 97110 THERAPEUTIC EXERCISES: CPT | Mod: PN
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
## 2022-07-22 ENCOUNTER — CLINICAL SUPPORT (OUTPATIENT)
Dept: REHABILITATION | Facility: HOSPITAL | Age: 32
End: 2022-07-22
Attending: PHYSICIAN ASSISTANT
Payer: COMMERCIAL

## 2022-07-22 DIAGNOSIS — M54.2 CERVICALGIA: Primary | ICD-10-CM

## 2022-07-22 PROCEDURE — 97110 THERAPEUTIC EXERCISES: CPT | Mod: PN
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
## 2022-07-25 ENCOUNTER — CLINICAL SUPPORT (OUTPATIENT)
Dept: REHABILITATION | Facility: HOSPITAL | Age: 32
End: 2022-07-25
Payer: COMMERCIAL

## 2022-07-25 DIAGNOSIS — M54.2 CERVICALGIA: Primary | ICD-10-CM

## 2022-07-25 PROCEDURE — 97110 THERAPEUTIC EXERCISES: CPT | Mod: PN
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
## 2022-07-26 ENCOUNTER — OFFICE VISIT (OUTPATIENT)
Dept: URGENT CARE | Facility: CLINIC | Age: 32
End: 2022-07-26
Payer: COMMERCIAL

## 2022-07-26 DIAGNOSIS — S46.911D SHOULDER STRAIN, RIGHT, SUBSEQUENT ENCOUNTER: ICD-10-CM

## 2022-07-26 DIAGNOSIS — Z02.6 ENCOUNTER RELATED TO WORKER'S COMPENSATION CLAIM: Primary | ICD-10-CM

## 2022-07-26 DIAGNOSIS — S16.1XXD STRAIN OF NECK MUSCLE, SUBSEQUENT ENCOUNTER: ICD-10-CM
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
## 2022-07-26 DIAGNOSIS — S46.912D SHOULDER STRAIN, LEFT, SUBSEQUENT ENCOUNTER: ICD-10-CM

## 2022-07-26 DIAGNOSIS — S29.019D THORACIC MYOFASCIAL STRAIN, SUBSEQUENT ENCOUNTER: ICD-10-CM

## 2022-07-26 PROCEDURE — 99214 PR OFFICE/OUTPT VISIT, EST, LEVL IV, 30-39 MIN: ICD-10-PCS | Mod: S$GLB,,,

## 2022-07-26 PROCEDURE — 99214 OFFICE O/P EST MOD 30 MIN: CPT | Mod: S$GLB,,,
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
## 2022-07-27 ENCOUNTER — CLINICAL SUPPORT (OUTPATIENT)
Dept: REHABILITATION | Facility: HOSPITAL | Age: 32
End: 2022-07-27
Attending: PHYSICIAN ASSISTANT
Payer: COMMERCIAL

## 2022-07-27 DIAGNOSIS — M54.2 CERVICALGIA: Primary | ICD-10-CM

## 2022-07-27 PROCEDURE — 97140 MANUAL THERAPY 1/> REGIONS: CPT | Mod: PN

## 2022-07-27 PROCEDURE — 97110 THERAPEUTIC EXERCISES: CPT | Mod: PN
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
## 2022-07-29 ENCOUNTER — CLINICAL SUPPORT (OUTPATIENT)
Dept: REHABILITATION | Facility: HOSPITAL | Age: 32
End: 2022-07-29
Attending: PHYSICIAN ASSISTANT
Payer: COMMERCIAL

## 2022-07-29 DIAGNOSIS — M54.2 CERVICALGIA: Primary | ICD-10-CM

## 2022-07-29 PROCEDURE — 97110 THERAPEUTIC EXERCISES: CPT | Mod: PN,CQ

## 2022-07-29 PROCEDURE — 97140 MANUAL THERAPY 1/> REGIONS: CPT | Mod: PN,CQ
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
## 2022-08-01 ENCOUNTER — CLINICAL SUPPORT (OUTPATIENT)
Dept: REHABILITATION | Facility: HOSPITAL | Age: 32
End: 2022-08-01
Attending: PHYSICIAN ASSISTANT
Payer: COMMERCIAL

## 2022-08-01 DIAGNOSIS — M54.2 CERVICALGIA: Primary | ICD-10-CM

## 2022-08-01 PROCEDURE — 97110 THERAPEUTIC EXERCISES: CPT | Mod: PN,CQ

## 2022-08-01 PROCEDURE — 97140 MANUAL THERAPY 1/> REGIONS: CPT | Mod: PN,CQ
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
## 2022-08-03 ENCOUNTER — CLINICAL SUPPORT (OUTPATIENT)
Dept: REHABILITATION | Facility: HOSPITAL | Age: 32
End: 2022-08-03
Payer: COMMERCIAL

## 2022-08-03 DIAGNOSIS — M54.2 CERVICALGIA: Primary | ICD-10-CM

## 2022-08-03 PROCEDURE — 97110 THERAPEUTIC EXERCISES: CPT | Mod: PN,CQ
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
## 2022-08-05 ENCOUNTER — OFFICE VISIT (OUTPATIENT)
Dept: URGENT CARE | Facility: CLINIC | Age: 32
End: 2022-08-05
Payer: COMMERCIAL

## 2022-08-05 VITALS
WEIGHT: 180 LBS | SYSTOLIC BLOOD PRESSURE: 136 MMHG | BODY MASS INDEX: 28.93 KG/M2 | OXYGEN SATURATION: 98 % | HEART RATE: 74 BPM | DIASTOLIC BLOOD PRESSURE: 92 MMHG | TEMPERATURE: 99 F | HEIGHT: 66 IN
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
## 2022-08-05 DIAGNOSIS — S16.1XXD STRAIN OF NECK MUSCLE, SUBSEQUENT ENCOUNTER: Primary | ICD-10-CM

## 2022-08-05 DIAGNOSIS — S29.019D THORACIC MYOFASCIAL STRAIN, SUBSEQUENT ENCOUNTER: ICD-10-CM

## 2022-08-05 PROCEDURE — 99214 PR OFFICE/OUTPT VISIT, EST, LEVL IV, 30-39 MIN: ICD-10-PCS | Mod: S$GLB,,,

## 2022-08-05 PROCEDURE — 99214 OFFICE O/P EST MOD 30 MIN: CPT | Mod: S$GLB,,,
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
## 2022-08-08 ENCOUNTER — CLINICAL SUPPORT (OUTPATIENT)
Dept: REHABILITATION | Facility: HOSPITAL | Age: 32
End: 2022-08-08
Payer: COMMERCIAL

## 2022-08-08 DIAGNOSIS — M54.2 CERVICALGIA: Primary | ICD-10-CM

## 2022-08-08 PROCEDURE — 97110 THERAPEUTIC EXERCISES: CPT | Mod: PN
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
## 2022-08-25 ENCOUNTER — CLINICAL SUPPORT (OUTPATIENT)
Dept: REHABILITATION | Facility: HOSPITAL | Age: 32
End: 2022-08-25
Payer: COMMERCIAL

## 2022-08-25 DIAGNOSIS — M54.2 CERVICALGIA: Primary | ICD-10-CM

## 2022-08-25 PROCEDURE — 97110 THERAPEUTIC EXERCISES: CPT | Mod: PN

## 2022-08-25 PROCEDURE — 97140 MANUAL THERAPY 1/> REGIONS: CPT | Mod: PN
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
## 2022-08-26 ENCOUNTER — OFFICE VISIT (OUTPATIENT)
Dept: URGENT CARE | Facility: CLINIC | Age: 32
End: 2022-08-26
Payer: COMMERCIAL

## 2022-08-26 VITALS
DIASTOLIC BLOOD PRESSURE: 87 MMHG | BODY MASS INDEX: 28.93 KG/M2 | WEIGHT: 180 LBS | TEMPERATURE: 98 F | HEART RATE: 87 BPM | HEIGHT: 66 IN | SYSTOLIC BLOOD PRESSURE: 136 MMHG | OXYGEN SATURATION: 97 % | RESPIRATION RATE: 17 BRPM

## 2022-08-26 DIAGNOSIS — S16.1XXD STRAIN OF NECK MUSCLE, SUBSEQUENT ENCOUNTER: Primary | ICD-10-CM

## 2022-08-26 DIAGNOSIS — S46.911D SHOULDER STRAIN, RIGHT, SUBSEQUENT ENCOUNTER: ICD-10-CM
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
## 2022-08-26 DIAGNOSIS — S29.019D THORACIC MYOFASCIAL STRAIN, SUBSEQUENT ENCOUNTER: ICD-10-CM

## 2022-08-26 DIAGNOSIS — S46.912D SHOULDER STRAIN, LEFT, SUBSEQUENT ENCOUNTER: ICD-10-CM

## 2022-08-26 PROCEDURE — 99214 PR OFFICE/OUTPT VISIT, EST, LEVL IV, 30-39 MIN: ICD-10-PCS | Mod: S$GLB,,,

## 2022-08-26 PROCEDURE — 99214 OFFICE O/P EST MOD 30 MIN: CPT | Mod: S$GLB,,,
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
## 2022-09-01 ENCOUNTER — CLINICAL SUPPORT (OUTPATIENT)
Dept: REHABILITATION | Facility: HOSPITAL | Age: 32
End: 2022-09-01
Payer: COMMERCIAL

## 2022-09-01 DIAGNOSIS — M54.2 CERVICALGIA: Primary | ICD-10-CM

## 2022-09-01 PROCEDURE — 97110 THERAPEUTIC EXERCISES: CPT | Mod: PN
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
## 2022-09-01 NOTE — PATIENT INSTRUCTIONS
Access Code: [email or web address]  URL: [email or web address]  Date: 09/01/2022  Prepared by: Franco Rizo    Exercises  Standing with Forearms Thoracic Rotation - 1 x daily - 7 x weekly - 2 sets - 10 reps  Quadruped Cervical Retraction - 1 x daily - 7 x weekly - 2 sets - 10 reps - 3 hold  Quadruped Alternating Arm Lift - 1 x daily - 7 x weekly - 2 sets - 10 reps  Standing Row with Resistance - 1 x daily - 7 x weekly - 2 sets - 10 reps  Shoulder External Rotation and Scapular Retraction with Resistance - 1 x daily - 7 x weekly - 2 sets - 10 reps  Seated Levator Scapulae Stretch - 1 x daily - 7 x weekly - 1 sets - 3 reps - 30 hold    Patient Education  Managing Neck Pain    Neck pain is very common; and it can happen for a variety of reasons. It may sneak up on you with prolonged sitting, come on suddenly from a recent or past event, or it may seemingly come out of nowhere.  If you have neck pain, youre not alone. Approximately 1 out of 10 adults have neck pain at any one time, and nearly 50% of people will experience it at some point in their lives.  Oftentimes neck pain is caused by awkward posture, such as from sitting at a desk all day. Neck pain can also be caused by activities that strain the neck, such as painting a ceiling, sleeping in an odd position, or can result from an injury or accident. Lifestyle factors play a role, too: stress, smoking, being overweight, and overall poor health put you at greater risk for neck pain. Since the majority of neck pain is not caused by structural damage to your bones and tissues, imaging tests like x-rays and MRIs are rarely necessary.  Its important to realize that pain is your bodys way of getting your attention and telling you to make a change. Your body has a network of nerves that carry messages between your body and your brain. Your nerves monitor different parts of your body and work like an alarm system to warn you and your brain of potential threats. If your brain  senses a threat, it produces pain to tell your body to take action. Pain isnt something you need to be afraid of. Instead, think of pain as a signal to make a positive change.  With good self-care, neck pain can often be improved within several weeks. There are four ways that you can treat your neck pain at home: Healthy Lifestyle Changes; Exercise; Healthy Posture; and Ice or Heat.    1. Healthy Lifestyle Changes  First, being mindful of your overall health and wellness is important for reducing pain. Make sure to stay active, eat healthy food to maintain a healthy weight, get enough sleep, manage any chronic conditions, and stay up-to-date on preventive health screenings. Taking these steps will help you feel your best.  2. Exercise  Staying active and continuing to move within a pain-free range are particularly important for reducing neck pain since this promotes circulation, which helps with healing. If you havent exercised in a while, dont try to do too much at once. Work up to 30 minutes of moderate exercise at least 5 days per week. Choose activities you enjoy such as walking, biking, swimming, dancing, or yoga.  In addition to these activities, there are also a few specific exercises to restore the natural movement of your neck and upper back. These exercises will help relieve pain and make it easier to perform your daily activities. Three exercises that can help are shoulder blade squeezes, chin tucks, and neck rotations.  Shoulder Blade Squeezes: To do shoulder blade squeezes, begin standing or sitting with your arms at your sides. Gently squeeze your shoulder blades together, then relax and repeat. Make sure to keep your back relaxed and do not shrug your shoulders during the exercise.  Chin Tucks: To do chin tucks, begin looking straight ahead. Gently draw your chin in, while keeping your eyes fixed on something in front of you. Then tuck your chin down toward your chest, and hold briefly. Return to  the starting position and repeat. You can do this exercise sitting, standing, or lying down.  Neck Rotations: To do neck rotations, begin standing or sitting with your arms at your sides. Turn your head to look over one shoulder, hold briefly, then turn your head to the other side and do the same. Make sure to keep your back straight and your shoulders down.  Do each of these exercises at least twice a day, and as often as once per hour if you can. Hold each exercise for 5 seconds, and perform at least 5 repetitions.   If these exercises make you feel better, consider it a green light to continue with the exercise, but remember not to overdo it.  However, you may experience some discomfort with these exercises. If you notice an increase in pain, consider it a yellow light and proceed with caution.  If you notice worsening pain with these exercises, consider it a red light for now. Stop the exercise and move on to the next. Remember to try the exercise again later that day or the next day.  Its important to keep your body moving within your pain free zone. Take a moment now to pause the video and give these exercises a try.  3. Healthy Posture  In addition to staying active, its also important to be mindful of your posture while standing, sitting, and sleeping.  Standing: Healthy posture means staying relaxed in comfortable alignment, with your ears, shoulders, and hips in a straight line. Stand tall, keep your ears over your shoulders, and hold your shoulders back and down.  Sitting: When sitting, sit up straight and relax your shoulders. Place your feet flat on the floor, shoulder width apart. You can use a small pillow or towel roll to support the natural curve of your back. When sitting at the computer, keep your monitor at eye level and place your keyboard close to your body. Avoid using electronic devices on the couch or in bed and dont stay in one position for too long. Take short breaks frequently, and  move often.  Sleeping: When sleeping, your head should be level with the horizon. Use pillows or a rolled towel to support you. For back sleeping, place a pillow under your head for support and another pillow under your knees. For side sleeping, tuck a pillow into the base of your neck and place another pillow between your knees.  4. Ice and Heat  Using ice or heat can also help you find some relief from neck pain. For long-standing pain or discomfort, you can try either ice or heat and use whichever one works best for you. For a new onset of discomfort, ice is typically best.  Use an ice pack for 10-15 minutes every 2-3 hours. Be sure to wrap the ice pack in a towel to protect your skin.  For heat, use a heating pad on a low or medium setting for 15-20 minutes every 2-3 hours. You can also try taking a warm shower or using a single-use heat wrap.  Now that youve learned about management of neck pain, create a plan of action. Start doing the exercises throughout your day, and choose at least three of the other strategies to try at home this week.  If your symptoms do not improve, it could be that your bodys alarm system has remained sensitive. When this happens, it only takes a little bit of movement, activity, or emotional change to activate your sensitive alarm system and cause pain. This doesnt necessarily mean that your body is in danger, it just means that your nerves have changed the way they respond to normal sensations - making your brain more sensitive.  If this has happened, you can learn to calm your nervous system and train your brain to dial down the pain with activities like aerobic exercise, meditation, relaxation, deep breathing, goal setting, and improved sleep. If your pain has persisted for a while, try incorporating these activities into your routine.  Remember, if you take care of yourself, you can reduce or even eliminate your neck pain in just a few weeks. Staying healthy, exercising,  being mindful of your posture, and using ice and heat can go a long way in helping you find relief. View this video as many times as you like. If you still have any questions about neck pain, contact your health care provider.

## 2022-09-02 ENCOUNTER — CLINICAL SUPPORT (OUTPATIENT)
Dept: REHABILITATION | Facility: HOSPITAL | Age: 32
End: 2022-09-02
Payer: COMMERCIAL

## 2022-09-02 DIAGNOSIS — M54.2 CERVICALGIA: Primary | ICD-10-CM

## 2022-09-02 PROCEDURE — 97110 THERAPEUTIC EXERCISES: CPT | Mod: PN
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
## 2022-09-06 ENCOUNTER — CLINICAL SUPPORT (OUTPATIENT)
Dept: REHABILITATION | Facility: HOSPITAL | Age: 32
End: 2022-09-06
Payer: COMMERCIAL

## 2022-09-06 DIAGNOSIS — M54.2 CERVICALGIA: Primary | ICD-10-CM

## 2022-09-06 PROCEDURE — 97110 THERAPEUTIC EXERCISES: CPT | Mod: PN
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
## 2022-09-09 ENCOUNTER — CLINICAL SUPPORT (OUTPATIENT)
Dept: REHABILITATION | Facility: HOSPITAL | Age: 32
End: 2022-09-09
Payer: COMMERCIAL

## 2022-09-09 DIAGNOSIS — M54.2 CERVICALGIA: Primary | ICD-10-CM

## 2022-09-09 PROCEDURE — 97110 THERAPEUTIC EXERCISES: CPT | Mod: PN
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
## 2022-09-13 NOTE — Clinical Note
"Monica"Dory Aparicio was seen and treated in our emergency department on 5/20/2022.  She may return to work on 05/23/2022.  Please allow patient time to follow up with primary care and further specialties if pain persist. If patient unable to follow up with primary care, please allow patient more time off until able to follow up.      If you have any questions or concerns, please don't hesitate to call.       RN    " x1 HMV

## 2022-09-14 ENCOUNTER — TELEPHONE (OUTPATIENT)
Dept: FAMILY MEDICINE | Facility: CLINIC | Age: 32
End: 2022-09-14

## 2022-09-14 ENCOUNTER — CLINICAL SUPPORT (OUTPATIENT)
Dept: REHABILITATION | Facility: HOSPITAL | Age: 32
End: 2022-09-14
Payer: COMMERCIAL

## 2022-09-14 ENCOUNTER — OFFICE VISIT (OUTPATIENT)
Dept: URGENT CARE | Facility: CLINIC | Age: 32
End: 2022-09-14
Payer: COMMERCIAL

## 2022-09-14 VITALS
OXYGEN SATURATION: 100 % | HEIGHT: 66 IN | RESPIRATION RATE: 18 BRPM | BODY MASS INDEX: 28.93 KG/M2 | HEART RATE: 67 BPM | SYSTOLIC BLOOD PRESSURE: 122 MMHG | WEIGHT: 180 LBS | DIASTOLIC BLOOD PRESSURE: 81 MMHG | TEMPERATURE: 98 F

## 2022-09-14 DIAGNOSIS — S16.1XXD STRAIN OF NECK MUSCLE, SUBSEQUENT ENCOUNTER: ICD-10-CM

## 2022-09-14 DIAGNOSIS — S46.911D SHOULDER STRAIN, RIGHT, SUBSEQUENT ENCOUNTER: ICD-10-CM

## 2022-09-14 DIAGNOSIS — M54.2 CERVICALGIA: Primary | ICD-10-CM

## 2022-09-14 DIAGNOSIS — Z02.6 ENCOUNTER RELATED TO WORKER'S COMPENSATION CLAIM: Primary | ICD-10-CM

## 2022-09-14 DIAGNOSIS — S29.019D THORACIC MYOFASCIAL STRAIN, SUBSEQUENT ENCOUNTER: ICD-10-CM

## 2022-09-14 DIAGNOSIS — S46.912D SHOULDER STRAIN, LEFT, SUBSEQUENT ENCOUNTER: ICD-10-CM
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
## 2022-09-14 PROCEDURE — 97110 THERAPEUTIC EXERCISES: CPT | Mod: PN

## 2022-09-14 PROCEDURE — 99214 PR OFFICE/OUTPT VISIT, EST, LEVL IV, 30-39 MIN: ICD-10-PCS | Mod: S$GLB,,,

## 2022-09-14 PROCEDURE — 99214 OFFICE O/P EST MOD 30 MIN: CPT | Mod: S$GLB,,,
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
## 2022-09-14 NOTE — TELEPHONE ENCOUNTER
----- Message from Charu Schuster MA sent at 9/14/2022  9:01 AM CDT -----    ----- Message -----  From: Mervat Benoit MA  Sent: 9/14/2022   8:05 AM CDT  To: Floyd Perkins

## 2022-09-16 ENCOUNTER — CLINICAL SUPPORT (OUTPATIENT)
Dept: REHABILITATION | Facility: HOSPITAL | Age: 32
End: 2022-09-16
Payer: COMMERCIAL

## 2022-09-16 DIAGNOSIS — M54.2 CERVICALGIA: Primary | ICD-10-CM

## 2022-09-16 PROCEDURE — 97110 THERAPEUTIC EXERCISES: CPT | Mod: PN

## 2022-09-16 PROCEDURE — 97140 MANUAL THERAPY 1/> REGIONS: CPT | Mod: PN
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
## 2022-09-21 ENCOUNTER — CLINICAL SUPPORT (OUTPATIENT)
Dept: REHABILITATION | Facility: HOSPITAL | Age: 32
End: 2022-09-21
Payer: COMMERCIAL

## 2022-09-21 DIAGNOSIS — M54.2 CERVICALGIA: Primary | ICD-10-CM

## 2022-09-21 PROCEDURE — 97110 THERAPEUTIC EXERCISES: CPT | Mod: PN
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
## 2022-09-23 ENCOUNTER — CLINICAL SUPPORT (OUTPATIENT)
Dept: REHABILITATION | Facility: HOSPITAL | Age: 32
End: 2022-09-23
Payer: COMMERCIAL

## 2022-09-23 DIAGNOSIS — M54.2 CERVICALGIA: Primary | ICD-10-CM

## 2022-09-23 PROCEDURE — 97110 THERAPEUTIC EXERCISES: CPT | Mod: PN
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
## 2022-09-30 ENCOUNTER — OFFICE VISIT (OUTPATIENT)
Dept: URGENT CARE | Facility: CLINIC | Age: 32
End: 2022-09-30
Payer: COMMERCIAL

## 2022-09-30 VITALS
BODY MASS INDEX: 28.93 KG/M2 | SYSTOLIC BLOOD PRESSURE: 143 MMHG | HEART RATE: 65 BPM | WEIGHT: 180 LBS | DIASTOLIC BLOOD PRESSURE: 85 MMHG | OXYGEN SATURATION: 85 % | HEIGHT: 66 IN | TEMPERATURE: 99 F

## 2022-09-30 DIAGNOSIS — Z02.6 ENCOUNTER RELATED TO WORKER'S COMPENSATION CLAIM: Primary | ICD-10-CM

## 2022-09-30 DIAGNOSIS — S46.912D SHOULDER STRAIN, LEFT, SUBSEQUENT ENCOUNTER: ICD-10-CM

## 2022-09-30 DIAGNOSIS — S29.019D THORACIC MYOFASCIAL STRAIN, SUBSEQUENT ENCOUNTER: ICD-10-CM

## 2022-09-30 DIAGNOSIS — S16.1XXD STRAIN OF NECK MUSCLE, SUBSEQUENT ENCOUNTER: ICD-10-CM

## 2022-09-30 DIAGNOSIS — S46.911D SHOULDER STRAIN, RIGHT, SUBSEQUENT ENCOUNTER: ICD-10-CM

## 2022-09-30 PROCEDURE — 99214 OFFICE O/P EST MOD 30 MIN: CPT | Mod: S$GLB,,,

## 2022-09-30 PROCEDURE — 99214 PR OFFICE/OUTPT VISIT, EST, LEVL IV, 30-39 MIN: ICD-10-PCS | Mod: S$GLB,,,
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
## 2022-10-12 ENCOUNTER — OFFICE VISIT (OUTPATIENT)
Dept: URGENT CARE | Facility: CLINIC | Age: 32
End: 2022-10-12
Payer: COMMERCIAL

## 2022-10-12 VITALS
RESPIRATION RATE: 18 BRPM | BODY MASS INDEX: 28.93 KG/M2 | WEIGHT: 180 LBS | TEMPERATURE: 98 F | OXYGEN SATURATION: 100 % | HEIGHT: 66 IN | SYSTOLIC BLOOD PRESSURE: 128 MMHG | DIASTOLIC BLOOD PRESSURE: 74 MMHG | HEART RATE: 38 BPM

## 2022-10-12 DIAGNOSIS — S46.912D SHOULDER STRAIN, LEFT, SUBSEQUENT ENCOUNTER: ICD-10-CM
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
## 2022-10-12 DIAGNOSIS — S29.019D THORACIC MYOFASCIAL STRAIN, SUBSEQUENT ENCOUNTER: Primary | ICD-10-CM

## 2022-10-12 DIAGNOSIS — S16.1XXD STRAIN OF NECK MUSCLE, SUBSEQUENT ENCOUNTER: ICD-10-CM

## 2022-10-12 DIAGNOSIS — S46.911D SHOULDER STRAIN, RIGHT, SUBSEQUENT ENCOUNTER: ICD-10-CM

## 2022-10-12 PROCEDURE — 99214 OFFICE O/P EST MOD 30 MIN: CPT | Mod: S$GLB,,,

## 2022-10-12 PROCEDURE — 99214 PR OFFICE/OUTPT VISIT, EST, LEVL IV, 30-39 MIN: ICD-10-PCS | Mod: S$GLB,,,

## 2022-10-12 RX ORDER — PREGABALIN 75 MG/1
75 CAPSULE ORAL 2 TIMES DAILY
Qty: 60 CAPSULE | Refills: 0 | Status: SHIPPED | OUTPATIENT
Start: 2022-10-12 | End: 2023-07-21
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
## 2022-11-09 ENCOUNTER — OFFICE VISIT (OUTPATIENT)
Dept: URGENT CARE | Facility: CLINIC | Age: 32
End: 2022-11-09
Payer: COMMERCIAL

## 2022-11-09 VITALS
DIASTOLIC BLOOD PRESSURE: 88 MMHG | HEART RATE: 67 BPM | OXYGEN SATURATION: 94 % | BODY MASS INDEX: 30.7 KG/M2 | TEMPERATURE: 99 F | WEIGHT: 191 LBS | HEIGHT: 66 IN | SYSTOLIC BLOOD PRESSURE: 136 MMHG
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
## 2022-11-09 DIAGNOSIS — S16.1XXD STRAIN OF NECK MUSCLE, SUBSEQUENT ENCOUNTER: ICD-10-CM

## 2022-11-09 DIAGNOSIS — S46.912D SHOULDER STRAIN, LEFT, SUBSEQUENT ENCOUNTER: ICD-10-CM

## 2022-11-09 DIAGNOSIS — Z02.6 ENCOUNTER RELATED TO WORKER'S COMPENSATION CLAIM: Primary | ICD-10-CM

## 2022-11-09 DIAGNOSIS — S29.019D THORACIC MYOFASCIAL STRAIN, SUBSEQUENT ENCOUNTER: ICD-10-CM

## 2022-11-09 PROCEDURE — 99214 PR OFFICE/OUTPT VISIT, EST, LEVL IV, 30-39 MIN: ICD-10-PCS | Mod: S$GLB,,,

## 2022-11-09 PROCEDURE — 99214 OFFICE O/P EST MOD 30 MIN: CPT | Mod: S$GLB,,,
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
## 2022-11-13 ENCOUNTER — PATIENT MESSAGE (OUTPATIENT)
Dept: FAMILY MEDICINE | Facility: CLINIC | Age: 32
End: 2022-11-13

## 2022-12-02 ENCOUNTER — TELEPHONE (OUTPATIENT)
Dept: URGENT CARE | Facility: CLINIC | Age: 32
End: 2022-12-02
Payer: COMMERCIAL

## 2022-12-02 NOTE — TELEPHONE ENCOUNTER
Called patient and informed her of the PT being approved, she was very happy and excited.  I Informed the patient that PT would reach out to her to get her scheduled once Pre-Cert has placed everything into her chart. ROMINA

## 2022-12-02 NOTE — TELEPHONE ENCOUNTER
Patient called inquiring about her Referral for PT. Informed the patient that the referral is still pending due to her refusing to go to the facility she was originally scheduled for. Looked at the referral and this is what was stated.

## 2022-12-07 ENCOUNTER — CLINICAL SUPPORT (OUTPATIENT)
Dept: REHABILITATION | Facility: HOSPITAL | Age: 32
End: 2022-12-07
Attending: PHYSICAL MEDICINE & REHABILITATION
Payer: COMMERCIAL

## 2022-12-07 DIAGNOSIS — M54.2 CERVICALGIA: Primary | ICD-10-CM

## 2022-12-07 PROCEDURE — 97161 PT EVAL LOW COMPLEX 20 MIN: CPT | Mod: PN

## 2022-12-07 PROCEDURE — 97140 MANUAL THERAPY 1/> REGIONS: CPT | Mod: PN

## 2022-12-07 PROCEDURE — 97110 THERAPEUTIC EXERCISES: CPT | Mod: PN

## 2022-12-09 ENCOUNTER — CLINICAL SUPPORT (OUTPATIENT)
Dept: REHABILITATION | Facility: HOSPITAL | Age: 32
End: 2022-12-09
Payer: COMMERCIAL

## 2022-12-09 DIAGNOSIS — M54.2 CERVICALGIA: Primary | ICD-10-CM

## 2022-12-09 PROCEDURE — 97140 MANUAL THERAPY 1/> REGIONS: CPT | Mod: PN,CQ

## 2022-12-09 PROCEDURE — 97110 THERAPEUTIC EXERCISES: CPT | Mod: PN,CQ
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
## 2022-12-12 ENCOUNTER — CLINICAL SUPPORT (OUTPATIENT)
Dept: REHABILITATION | Facility: HOSPITAL | Age: 32
End: 2022-12-12
Payer: COMMERCIAL

## 2022-12-12 DIAGNOSIS — M54.2 CERVICALGIA: Primary | ICD-10-CM

## 2022-12-12 PROCEDURE — 97140 MANUAL THERAPY 1/> REGIONS: CPT | Mod: PN,CQ

## 2022-12-12 PROCEDURE — 97110 THERAPEUTIC EXERCISES: CPT | Mod: PN,CQ
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
## 2022-12-13 ENCOUNTER — CLINICAL SUPPORT (OUTPATIENT)
Dept: REHABILITATION | Facility: HOSPITAL | Age: 32
End: 2022-12-13
Payer: COMMERCIAL

## 2022-12-13 DIAGNOSIS — M54.2 CERVICALGIA: Primary | ICD-10-CM

## 2022-12-13 PROCEDURE — 97140 MANUAL THERAPY 1/> REGIONS: CPT | Mod: PN,CQ

## 2022-12-13 PROCEDURE — 97110 THERAPEUTIC EXERCISES: CPT | Mod: PN,CQ
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
## 2022-12-14 ENCOUNTER — DOCUMENTATION ONLY (OUTPATIENT)
Dept: REHABILITATION | Facility: HOSPITAL | Age: 32
End: 2022-12-14
Payer: COMMERCIAL

## 2022-12-14 NOTE — PROGRESS NOTES
Pt's nurse , Patrica, reached out to our team requesting initial PT note. Pt was sent the pt's eval note and called to speak directly with Patrica to update her on Monica's case. Jacqueline did request that all updated POC notes are sent over to her referring provider from [facility]. Pt will follow up with [facility] on 1/5/2023 and Patrica would like to receive the pt's PT notes prior to that visit. If any additional PT visits are required, please funnel to external provider.

## 2022-12-16 ENCOUNTER — CLINICAL SUPPORT (OUTPATIENT)
Dept: REHABILITATION | Facility: HOSPITAL | Age: 32
End: 2022-12-16
Payer: COMMERCIAL

## 2022-12-16 DIAGNOSIS — M54.2 CERVICALGIA: Primary | ICD-10-CM

## 2022-12-16 PROCEDURE — 97110 THERAPEUTIC EXERCISES: CPT | Mod: PN

## 2022-12-16 PROCEDURE — 97140 MANUAL THERAPY 1/> REGIONS: CPT | Mod: PN

## 2022-12-16 NOTE — PROGRESS NOTES
Workers' Compensation Physical Therapy Daily Treatment Note     Name: Monica [place]  Clinic Number: [id number]    Therapy Diagnosis:   Encounter Diagnosis   Name Primary?    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    SUBJECTIVE     She was compliant with home exercise program.  Response to previous treatment: L side of neck feeling tight   Function: was able to participate in work gift exchange     Pain: 0/10 neck  Location: n/a    OBJECTIVE     Objective Measures updated at progress report unless specified.     TREATMENT   Monica received therapeutic exercises to develop strength, endurance, ROM, flexibility, and posture for 62 minutes including:  - supine cervical chin tucks x15 with 5 sec hold  - supine cervical rotations x16 with 5 sec hold  - Supine DNF x10   - L levator scap stretch 2x30 sec  - Cervical isometrics progressing into ROM rotation L 1x10  - Cervical isometrics progressing into extension 1x10    Circuit: 2x  - Cervical Snag extension x10   - Cervical Snag L rotation x10     - R/L SL open book x10 with 5 sec hold  - quadruped cat & camel x15 with 5 sec hold   - thoracic extension in  "sitting with half roll: x15 with 5 sec hold    - Cervical rotation to L holding 8# KB on RUE: 1x10 with 5 sec hold    Circuit: 2x  Shoulder pull down contralateral cervical rotation with RTB anchored at wrists: x10/side  unilateral row c/ contralateral cervical rotation with RTB anchored at wrists: x10/side    Prone Ts: 2x10 with 3 sec hold  Prone Ys: 2x10 with 3 sec hold    SL ER with focus on eccentric control, 1#: 2x10/side    Monica received the following manual therapy techniques: Joint mobilizations, Manual traction, and Soft tissue Mobilization were applied to the: cervical regions for 10 minutes, including:  - Cervical distraction 3x45"   - Suboccipital STM  - STM to L upper trap and supraspinatus  - scapular mobilizations in all directions  - R/L UPA/Upglides R/L C3/4 and C4/5 Gr III x 2     Pt received 0 min of ice to L cervical region    Home Exercises Provided: Patient instructed to cont prior HEP. Exercises were reviewed and Monica was able to demonstrate them prior to the end of the session.  Monica demonstrated good  understanding of the education provided. See EMR under Patient Instructions for exercises provided during therapy sessions    ASSESSMENT   Patient requests manual intervention today. She then reports L sided mid cervical symptoms of tingling following manual intervention. L cervical rotation results in no change in reported L sided mid cervical symptom of tingling. R side bending results in no change in reported L sided mid cervical symptom of tingling. Chin tuck increases reported L sided mid cervical symptom of tingling. Patient is familiar with and able to complete all of her listed exercises with proper mechanics and appropriate level of challenge.     The patient's current job specific task deficits include the following: reaching to plug things into outlets, prolonged sitting as well (still has ergonomic changes made from last PT visit and is continuing to use [laptop stand, keyboard " and mouse bluetooth])    Monica is making fair progress towards meeting set goals.   Pt prognosis is Good.     Pt will continue to benefit from skilled Physical Therapy interventions in order to address the deficits listed in the problem list box on initial evaluation, provide education, and to address the musculoskeletal limitations and work-related functional deficits for their job as a Clinical Supervisor for [employer] .    Pt's spiritual, cultural and educational needs considered and pt agreeable to plan of care and goals.     Anticipated barriers to physical therapy: chronicity of current injury    Goals: Short Term Goals: 3 weeks   1.) Pt will become compliant and independent with her initial HEP to promote decreased pain and improved mobility and strength.   2.) Pt will become compliant and independent with an updated, progressed HEP to promote decreased pain and improved mobility and strength as well as prep for discharge from further PT intervention.   3.) Pt to report decrease in pain levels from 10/10 at worse to 3/10 at worse.   4.) Pt will improve FOTO impairment rating from currently 38% to 36% impairment rating.   5.) Pt will improve FOTO Work FABQ score from elevated to low to indicate improved fear avoidance beliefs.   6.) Pt will demonstrate improved Left cervical rotation from 49* to 79* to demonstrate improved cervical Left rotation to equal contralateral rotation.   7.) Pt will improve cervical extension ROM from 48* to 60* to demonstrate improved ability to look up and scan environment.     Plan     Plan of care Certification: 12/7/2022 to 1/6/2022     Outpatient Physical Therapy 3 times weekly for 3 weeks to include the following interventions: Manual Therapy, Moist Heat/ Ice, Neuromuscular Re-ed, Patient Education, Therapeutic Activities, and Therapeutic Exercise.      Upon discharge from further skilled PT intervention it will determined if the need for a work conditioning program or  Functional Capacity Evaluation is required to allow the injured worker to return to work with full potential achieved, continued improvement with body mechanics with advanced functional activities, and further prevention of future work-related injuries.     Elena Roy, PT   12/16/2022
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
## 2022-12-19 ENCOUNTER — CLINICAL SUPPORT (OUTPATIENT)
Dept: REHABILITATION | Facility: HOSPITAL | Age: 32
End: 2022-12-19
Payer: COMMERCIAL

## 2022-12-19 DIAGNOSIS — M54.2 CERVICALGIA: Primary | ICD-10-CM

## 2022-12-19 PROCEDURE — 97110 THERAPEUTIC EXERCISES: CPT | Mod: PN,CQ

## 2022-12-19 PROCEDURE — 97140 MANUAL THERAPY 1/> REGIONS: CPT | Mod: PN,CQ

## 2022-12-19 NOTE — PROGRESS NOTES
Workers' Compensation Physical Therapy Daily Treatment Note     Name: Monica Mckenna  Clinic Number: [id number]    Therapy Diagnosis:   Encounter Diagnosis   Name Primary?    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    SUBJECTIVE     She was compliant with home exercise program.  Response to previous treatment: increased B neck pain, L>R  Function: was able to participate in work gift exchange     Pain: 3/10 neck  Location: L sided cervical mm pain    OBJECTIVE     Objective Measures updated at progress report unless specified.     TREATMENT   Monica received therapeutic exercises to develop strength, endurance, ROM, flexibility, and posture for 35 minutes including:  - supine cervical chin tucks x15 with 5 sec hold  - supine cervical rotations x16 with 5 sec hold  - Supine DNF x10     - L levator scap stretch 5x30 sec  - R upper trap stretch 2x30 sec  - Cervical isometrics progressing into ROM rotation L 1x10  - Cervical isometrics progressing into extension 1x10    Circuit: 2x  - Cervical Snag extension x10   - Cervical Snag L rotation x10     - R/L SL open book x10 with 5 sec hold  - quadruped  "cat & camel x15 with 5 sec hold   - thoracic extension in sitting with half roll: x15 with 5 sec hold    - Cervical rotation to L holding 8# KB on RUE: 1x10 with 5 sec hold    Circuit: 2x  Shoulder pull down contralateral cervical rotation with RTB anchored at wrists: x10/side  unilateral row c/ contralateral cervical rotation with RTB anchored at wrists: x10/side    Prone Ts: 2x10 with 3 sec hold  Prone Ys: 2x10 with 3 sec hold    SL ER with focus on eccentric control, 1#: 2x10/side    Monica received the following manual therapy techniques: Joint mobilizations, Manual traction, and Soft tissue Mobilization were applied to the: cervical regions for 25 minutes, including:  - Cervical distraction 3x45"   - Suboccipitals, L upper trap, levator scap, & supraspinatusSTM  - scapular mobilizations in all directions  - R/L UPA/Upglides R/L C3/4 and C4/5 Gr III x 2     Pt received 10 min of MH to L cervical region prior to manual tx    Home Exercises Provided: Patient instructed to cont prior HEP. Exercises were reviewed and Monica was able to demonstrate them prior to the end of the session.  Monica demonstrated good  understanding of the education provided. See EMR under Patient Instructions for exercises provided during therapy sessions    ASSESSMENT     Patient presents with increased cervical pain than she has had in some time. Pt received manual tx and responded well to tx as pt endorsed significant decrease in cervical pain post today's tx visit. Therapeutic exercises were limited during today's tx visit as pt received manual tx for approximately half of today's tx session. Will resume therapeutic exercises during subsequent tx visits.     The patient's current job specific task deficits include the following: reaching to plug things into outlets, prolonged sitting as well (still has ergonomic changes made from last PT visit and is continuing to use [laptop stand, keyboard and mouse bluetooth])    Monica is making fair " progress towards meeting set goals.   Pt prognosis is Good.     Pt will continue to benefit from skilled Physical Therapy interventions in order to address the deficits listed in the problem list box on initial evaluation, provide education, and to address the musculoskeletal limitations and work-related functional deficits for their job as a Clinical Supervisor for [employer] .    Pt's spiritual, cultural and educational needs considered and pt agreeable to plan of care and goals.     Anticipated barriers to physical therapy: chronicity of current injury    Goals: Short Term Goals: 3 weeks   1.) Pt will become compliant and independent with her initial HEP to promote decreased pain and improved mobility and strength.   2.) Pt will become compliant and independent with an updated, progressed HEP to promote decreased pain and improved mobility and strength as well as prep for discharge from further PT intervention.   3.) Pt to report decrease in pain levels from 10/10 at worse to 3/10 at worse.   4.) Pt will improve FOTO impairment rating from currently 38% to 36% impairment rating.   5.) Pt will improve FOTO Work FABQ score from elevated to low to indicate improved fear avoidance beliefs.   6.) Pt will demonstrate improved Left cervical rotation from 49* to 79* to demonstrate improved cervical Left rotation to equal contralateral rotation.   7.) Pt will improve cervical extension ROM from 48* to 60* to demonstrate improved ability to look up and scan environment.     Plan     Plan of care Certification: 12/7/2022 to 1/6/2022     Outpatient Physical Therapy 3 times weekly for 3 weeks to include the following interventions: Manual Therapy, Moist Heat/ Ice, Neuromuscular Re-ed, Patient Education, Therapeutic Activities, and Therapeutic Exercise.      Upon discharge from further skilled PT intervention it will determined if the need for a work conditioning program or Functional Capacity Evaluation is required to  allow the injured worker to return to work with full potential achieved, continued improvement with body mechanics with advanced functional activities, and further prevention of future work-related injuries.     Rivera Branch, PTA   12/19/2022

## 2022-12-20 ENCOUNTER — DOCUMENTATION ONLY (OUTPATIENT)
Dept: REHABILITATION | Facility: HOSPITAL | Age: 32
End: 2022-12-20
Payer: COMMERCIAL

## 2022-12-20 DIAGNOSIS — M54.2 CERVICALGIA: Primary | ICD-10-CM

## 2022-12-20 NOTE — PROGRESS NOTES
Patient was scheduled for PHYSICAL THERAPY visit on 12/20/2022. She arrived for her visit but refused participation in today's visit due to request to only see Rivera Branch PTA for treatment. Her next scheduled appointment is on 12/23/2022 with Rivera.

## 2022-12-22 ENCOUNTER — DOCUMENTATION ONLY (OUTPATIENT)
Dept: REHABILITATION | Facility: HOSPITAL | Age: 32
End: 2022-12-22
Payer: COMMERCIAL

## 2022-12-22 NOTE — PROGRESS NOTES
PT called patient to let her know that the clinic will be open tomorrow, 12/23/2022, and she confirms planned attendance for her appointment.

## 2022-12-23 ENCOUNTER — CLINICAL SUPPORT (OUTPATIENT)
Dept: REHABILITATION | Facility: HOSPITAL | Age: 32
End: 2022-12-23
Payer: COMMERCIAL

## 2022-12-23 DIAGNOSIS — M54.2 CERVICALGIA: Primary | ICD-10-CM

## 2022-12-23 PROCEDURE — 97140 MANUAL THERAPY 1/> REGIONS: CPT | Mod: PN,CQ

## 2022-12-23 PROCEDURE — 97110 THERAPEUTIC EXERCISES: CPT | Mod: PN,CQ

## 2022-12-23 NOTE — PROGRESS NOTES
Workers' Compensation Physical Therapy Daily Treatment Note     Name: Monica White Post  Clinic Number: [id number]    Therapy Diagnosis:   Encounter Diagnosis   Name Primary?    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    SUBJECTIVE   Pt reports: did not have any cervical pain getting up this morning.   She was compliant with home exercise program.  Response to previous treatment: no adverse effects  Function: was able to participate in work gift exchange     Pain: 2 or 3/10 neck  Location: L sided cervical mm pain    OBJECTIVE     Objective Measures updated at progress report unless specified.     TREATMENT   Monica received therapeutic exercises to develop strength, endurance, ROM, flexibility, and posture for 80 minutes including:    Pt received MH to cervical spine during supine   - supine cervical chin tucks 2x15 with 5 sec hold  - supine cervical rotations 2x16 with 5 sec hold  - Supine DNF x10     - L levator scap stretch 3x30 sec  - R upper trap stretch 3x30 sec  - Cervical isometrics progressing into ROM rotation L 1x10  - Cervical isometrics progressing into  "extension 1x10    Circuit: 2x  - Cervical Snag extension x10   - Cervical Snag L rotation x10     - R/L SL open book x10 with 5 sec hold  - quadruped cat & camel x15 with 5 sec hold   - thoracic extension in sitting with half roll: x15 with 5 sec hold    - Cervical rotation to L holding 8# KB on RUE: 1x15 with 5 sec hold    Circuit: 2x  Shoulder pull down contralateral cervical rotation with RTB anchored at wrists: x10/side  unilateral row c/ contralateral cervical rotation with RTB anchored at wrists: x10/side    Prone Ts: 2x10 with 3 sec hold  Prone Ys: 2x10 with 3 sec hold    SL ER with focus on eccentric control, 1#: 2x10/side    Monica received the following manual therapy techniques: Joint mobilizations, Manual traction, and Soft tissue Mobilization were applied to the: cervical regions for 10 minutes, including:  - Cervical distraction 3x45"   - STM to L Suboccipitals, L upper trap, L levator scap  - scapular mobilizations in all directions  - R/L UPA/Upglides R/L C3/4 and C4/5 Gr III x 2       Home Exercises Provided: Patient instructed to cont prior HEP. Exercises were reviewed and Monica was able to demonstrate them prior to the end of the session.  Monica demonstrated good  understanding of the education provided. See EMR under Patient Instructions for exercises provided during therapy sessions    ASSESSMENT     Pt reports min to mod cervical pain upon arrival. Pt, however, responded well to today's tx and appeared to demonstrate decrease pain post tx visit. It was noted that periscapular and RC strengthening was resumed during today's tx session and reviewed as HEP. Pt would benefit from focusing on HEP during the remaining tx sesions in order to prepare pt for possible discharge at the end of the currently authorized visits.      The patient's current job specific task deficits include the following: reaching to plug things into outlets, prolonged sitting as well (still has ergonomic changes made from last " PT visit and is continuing to use [laptop stand, keyboard and mouse bluetooth])    Monica is making fair progress towards meeting set goals.   Pt prognosis is Good.     Pt will continue to benefit from skilled Physical Therapy interventions in order to address the deficits listed in the problem list box on initial evaluation, provide education, and to address the musculoskeletal limitations and work-related functional deficits for their job as a Clinical Supervisor for [employer] .    Pt's spiritual, cultural and educational needs considered and pt agreeable to plan of care and goals.     Anticipated barriers to physical therapy: chronicity of current injury    Goals: Short Term Goals: 3 weeks   1.) Pt will become compliant and independent with her initial HEP to promote decreased pain and improved mobility and strength.   2.) Pt will become compliant and independent with an updated, progressed HEP to promote decreased pain and improved mobility and strength as well as prep for discharge from further PT intervention.   3.) Pt to report decrease in pain levels from 10/10 at worse to 3/10 at worse.   4.) Pt will improve FOTO impairment rating from currently 38% to 36% impairment rating.   5.) Pt will improve FOTO Work FABQ score from elevated to low to indicate improved fear avoidance beliefs.   6.) Pt will demonstrate improved Left cervical rotation from 49* to 79* to demonstrate improved cervical Left rotation to equal contralateral rotation.   7.) Pt will improve cervical extension ROM from 48* to 60* to demonstrate improved ability to look up and scan environment.     Plan     Plan of care Certification: 12/7/2022 to 1/6/2022     Outpatient Physical Therapy 3 times weekly for 3 weeks to include the following interventions: Manual Therapy, Moist Heat/ Ice, Neuromuscular Re-ed, Patient Education, Therapeutic Activities, and Therapeutic Exercise.      Upon discharge from further skilled PT intervention it will  determined if the need for a work conditioning program or Functional Capacity Evaluation is required to allow the injured worker to return to work with full potential achieved, continued improvement with body mechanics with advanced functional activities, and further prevention of future work-related injuries.     Rivera Branch, PTA   12/23/2022

## 2022-12-27 ENCOUNTER — CLINICAL SUPPORT (OUTPATIENT)
Dept: REHABILITATION | Facility: HOSPITAL | Age: 32
End: 2022-12-27
Payer: COMMERCIAL

## 2022-12-27 DIAGNOSIS — M54.2 CERVICALGIA: Primary | ICD-10-CM

## 2022-12-27 PROCEDURE — 97140 MANUAL THERAPY 1/> REGIONS: CPT | Mod: PN,CQ

## 2022-12-27 PROCEDURE — 97110 THERAPEUTIC EXERCISES: CPT | Mod: PN,CQ

## 2022-12-27 NOTE — PROGRESS NOTES
Workers' Compensation Physical Therapy Daily Treatment Note     Name: Monica Busby  Clinic Number: [id number]    Therapy Diagnosis:   Encounter Diagnosis   Name Primary?    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    SUBJECTIVE   Pt reports: that she is performing her home exercises with less intensity but with more consistency. Pt stated that this has decreased her pain levels.   She was compliant with home exercise program.  Response to previous treatment: no adverse effects  Function: was able to participate in work gift exchange     Pain: 2 or 3/10 neck  Location: L sided cervical mm pain    OBJECTIVE     Objective Measures updated at progress report unless specified.     TREATMENT   Monica received therapeutic exercises to develop strength, endurance, ROM, flexibility, and posture for 67 minutes including:    Pt received MH to cervical spine during supine   - supine cervical chin tucks 2x15 with 5 sec hold  - supine cervical rotations 2x16 with 5 sec hold  - Supine DNF x10     - L levator scap stretch 3x30 sec  - R upper trap stretch 3x30 sec  - Cervical isometrics progressing  "into ROM rotation L 1x10  - Cervical isometrics progressing into extension 1x10    Circuit: 2x  - Cervical Snag extension x10   - Cervical Snag L rotation x10     - R/L SL open book x10 with 5 sec hold  - quadruped cat & camel x15 with 5 sec hold   - thoracic extension in sitting with half roll: x15 with 5 sec hold    - Cervical rotation to L holding 8# KB on RUE: 1x15 with 5 sec hold    Circuit: 2x  Shoulder pull down contralateral cervical rotation with RTB anchored at wrists: x10/side  unilateral row c/ contralateral cervical rotation with RTB anchored at wrists: x10/side    Prone Ts: 2x10 with 3 sec hold  Prone Ys: 2x5 with 3 sec hold    Ys in standing against wall: 2x10    SL ER with focus on eccentric control, 1#: 2x10/side    Monica received the following manual therapy techniques: Joint mobilizations, Manual traction, and Soft tissue Mobilization were applied to the: cervical regions for 8 minutes, including:  - Cervical distraction 3x45"   - STM to L middle trap/rhomboid, L upper trap, L levator scap  - scapular mobilizations in all directions  - R/L UPA/Upglides R/L C3/4 and C4/5 Gr III x 2       Home Exercises Provided: Patient instructed to cont prior HEP. Exercises were reviewed and Monica was able to demonstrate them prior to the end of the session.  Monica demonstrated good  understanding of the education provided. See EMR under Patient Instructions for exercises provided during therapy sessions    ASSESSMENT   Pt presented with min cervical pain upon arrival. Pt performed all exercises with proper mechanics and fluidity. Pt continued to respond well to today's tx and appeared to demonstrate decrease pain post tx visit. It was noted that periscapular and RC strengthening was continued during today's tx session and reviewed as HEP. HEP was not issued during today's tx visit but will be emailed to pt in order to prepare pt for possible discharge during the next tx visit.     The patient's current job " specific task deficits include the following: reaching to plug things into outlets, prolonged sitting as well (still has ergonomic changes made from last PT visit and is continuing to use [laptop stand, keyboard and mouse bluetooth])    Monica is making fair progress towards meeting set goals.   Pt prognosis is Good.     Pt will continue to benefit from skilled Physical Therapy interventions in order to address the deficits listed in the problem list box on initial evaluation, provide education, and to address the musculoskeletal limitations and work-related functional deficits for their job as a Clinical Supervisor for [employer] .    Pt's spiritual, cultural and educational needs considered and pt agreeable to plan of care and goals.     Anticipated barriers to physical therapy: chronicity of current injury    Goals: Short Term Goals: 3 weeks   1.) Pt will become compliant and independent with her initial HEP to promote decreased pain and improved mobility and strength.   2.) Pt will become compliant and independent with an updated, progressed HEP to promote decreased pain and improved mobility and strength as well as prep for discharge from further PT intervention.   3.) Pt to report decrease in pain levels from 10/10 at worse to 3/10 at worse.   4.) Pt will improve FOTO impairment rating from currently 38% to 36% impairment rating.   5.) Pt will improve FOTO Work FABQ score from elevated to low to indicate improved fear avoidance beliefs.   6.) Pt will demonstrate improved Left cervical rotation from 49* to 79* to demonstrate improved cervical Left rotation to equal contralateral rotation.   7.) Pt will improve cervical extension ROM from 48* to 60* to demonstrate improved ability to look up and scan environment.     Plan     Plan of care Certification: 12/7/2022 to 1/6/2022     Outpatient Physical Therapy 3 times weekly for 3 weeks to include the following interventions: Manual Therapy, Moist Heat/ Ice,  Neuromuscular Re-ed, Patient Education, Therapeutic Activities, and Therapeutic Exercise.      Upon discharge from further skilled PT intervention it will determined if the need for a work conditioning program or Functional Capacity Evaluation is required to allow the injured worker to return to work with full potential achieved, continued improvement with body mechanics with advanced functional activities, and further prevention of future work-related injuries.     Rivera Branch, PTA   12/27/2022

## 2023-01-03 ENCOUNTER — DOCUMENTATION ONLY (OUTPATIENT)
Dept: REHABILITATION | Facility: HOSPITAL | Age: 33
End: 2023-01-03
Payer: COMMERCIAL

## 2023-01-03 DIAGNOSIS — M54.2 CERVICALGIA: Primary | ICD-10-CM
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
## 2023-01-09 ENCOUNTER — CLINICAL SUPPORT (OUTPATIENT)
Dept: REHABILITATION | Facility: HOSPITAL | Age: 33
End: 2023-01-09
Payer: COMMERCIAL

## 2023-01-09 DIAGNOSIS — M54.2 CERVICALGIA: Primary | ICD-10-CM

## 2023-01-09 PROCEDURE — 97110 THERAPEUTIC EXERCISES: CPT | Mod: PN,CQ

## 2023-01-09 NOTE — PROGRESS NOTES
[employer] Physical Therapy Daily Treatment Note     Name: Monica Shelburn  Clinic Number: [id number]    Therapy Diagnosis:   Encounter Diagnosis   Name Primary?    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    SUBJECTIVE   Pt reports: that her home exercises feels much better as they are becoming easier. She was compliant with home exercise program.  Response to previous treatment: no adverse effects  Function: was able to participate in work gift exchange     Pain: 0/10 neck  Location: n/a    OBJECTIVE     Objective Measures updated at progress report unless specified.     TREATMENT   Monica received therapeutic exercises to develop strength, endurance, ROM, flexibility, and posture for 65 minutes including:    Pt received MH to cervical spine during supine   - supine cervical chin tucks 2x15 with 5 sec hold  - supine cervical rotations 2x15 with 5 sec hold  - supine DNF x10     - L levator scap stretch 3x30 sec  - R upper trap stretch 3x30 sec  - Cervical isometrics progressing into ROM rotation L 1x10  - Cervical isometrics progressing into extension 1x10    Circuit: 2x  -  "Cervical Snag extension x10   - Cervical Snag L rotation x10     - R/L SL open book x10 with 5 sec hold  - quadruped cat & camel x15 with 5 sec hold   - thoracic extension in sitting with half roll: x15 with 5 sec hold  - Cervical rotation to L holding 8# KB on RUE: 1x15 with 5 sec hold    Circuit: 2x  Shoulder pull down contralateral cervical rotation with RTB anchored at wrists: x10/side  unilateral row c/ contralateral cervical rotation with RTB anchored at wrists: x10/side    Prone Ts: 2x10 with 3 sec hold  Prone Ys: 2x5 with 3 sec hold    Ys in standing against wall: 2x10    SL ER with focus on eccentric control: 2 trials of - 2# to fatigue plus 1# to fatigue    Monica received the following manual therapy techniques: Joint mobilizations, Manual traction, and Soft tissue Mobilization were applied to the: cervical regions for 0 minutes, including:  - Cervical distraction 3x45"   - STM to L middle trap/rhomboid, L upper trap, L levator scap  - scapular mobilizations in all directions  - R/L UPA/Upglides R/L C3/4 and C4/5 Gr III x 2       Home Exercises Provided: Patient instructed to cont prior HEP. Exercises were reviewed and Monica was able to demonstrate them prior to the end of the session.  Monica demonstrated good  understanding of the education provided. See EMR under Patient Instructions for exercises provided during therapy sessions    ASSESSMENT   Pt presented without any cervical pain upon arrival. Pt performed all exercises with proper mechanics and fluidity. Pt continued to respond well to today's tx and did not report any pain post tx visit. Periscapular and RC strengthening was continued during today's tx session. It was noted that pt was able to perform sidelying shoulder ER drop sets to fatigue with not reports of pain.     The patient's current job specific task deficits include the following: reaching to plug things into outlets, prolonged sitting as well (still has ergonomic changes made from " last PT visit and is continuing to use [laptop stand, keyboard and mouse bluetooth])    Monica is making fair progress towards meeting set goals.   Pt prognosis is Good.     Pt will continue to benefit from skilled Physical Therapy interventions in order to address the deficits listed in the problem list box on initial evaluation, provide education, and to address the musculoskeletal limitations and work-related functional deficits for their job as a Clinical Supervisor for [employer] .    Pt's spiritual, cultural and educational needs considered and pt agreeable to plan of care and goals.     Anticipated barriers to physical therapy: chronicity of current injury    Goals: Short Term Goals: 3 weeks   1.) Pt will become compliant and independent with her initial HEP to promote decreased pain and improved mobility and strength.   2.) Pt will become compliant and independent with an updated, progressed HEP to promote decreased pain and improved mobility and strength as well as prep for discharge from further PT intervention.   3.) Pt to report decrease in pain levels from 10/10 at worse to 3/10 at worse.   4.) Pt will improve FOTO impairment rating from currently 38% to 36% impairment rating.   5.) Pt will improve FOTO Work FABQ score from elevated to low to indicate improved fear avoidance beliefs.   6.) Pt will demonstrate improved Left cervical rotation from 49* to 79* to demonstrate improved cervical Left rotation to equal contralateral rotation.   7.) Pt will improve cervical extension ROM from 48* to 60* to demonstrate improved ability to look up and scan environment.     Plan     Plan of care Certification: 12/7/2022 to 1/6/2022     Outpatient Physical Therapy 3 times weekly for 3 weeks to include the following interventions: Manual Therapy, Moist Heat/ Ice, Neuromuscular Re-ed, Patient Education, Therapeutic Activities, and Therapeutic Exercise.      Upon discharge from further skilled PT intervention it  will determined if the need for a work conditioning program or Functional Capacity Evaluation is required to allow the injured worker to return to work with full potential achieved, continued improvement with body mechanics with advanced functional activities, and further prevention of future work-related injuries.     Rivera Branch, PTA   1/9/2023

## 2023-01-30 ENCOUNTER — TELEPHONE (OUTPATIENT)
Dept: URGENT CARE | Facility: CLINIC | Age: 33
End: 2023-01-30
Payer: COMMERCIAL

## 2023-01-30 NOTE — TELEPHONE ENCOUNTER
Pt called about resuming PT.  She states her provider at [facility] recommended continuing PT.  I informed her that we have not received anything from [facility] to resume PT

## 2023-02-15 ENCOUNTER — TELEPHONE (OUTPATIENT)
Dept: URGENT CARE | Facility: CLINIC | Age: 33
End: 2023-02-15
Payer: COMMERCIAL

## 2023-02-16 DIAGNOSIS — M54.2 CERVICALGIA: Primary | ICD-10-CM

## 2023-02-17 ENCOUNTER — TELEPHONE (OUTPATIENT)
Dept: URGENT CARE | Facility: CLINIC | Age: 33
End: 2023-02-17
Payer: COMMERCIAL

## 2023-02-17 NOTE — TELEPHONE ENCOUNTER
Patient called inquiring abouther referral, informed the patient that we will be sending a in basket message to the Pre-Service Dept and they will reach out to her. ROMINA

## 2023-02-24 ENCOUNTER — CLINICAL SUPPORT (OUTPATIENT)
Dept: REHABILITATION | Facility: HOSPITAL | Age: 33
End: 2023-02-24
Attending: PHYSICAL MEDICINE & REHABILITATION
Payer: COMMERCIAL

## 2023-02-24 DIAGNOSIS — M54.2 CERVICALGIA: Primary | ICD-10-CM

## 2023-02-24 PROCEDURE — 97140 MANUAL THERAPY 1/> REGIONS: CPT | Mod: PN

## 2023-02-24 PROCEDURE — 97110 THERAPEUTIC EXERCISES: CPT | Mod: PN

## 2023-02-24 PROCEDURE — 97164 PT RE-EVAL EST PLAN CARE: CPT | Mod: PN
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
## 2023-03-03 ENCOUNTER — CLINICAL SUPPORT (OUTPATIENT)
Dept: REHABILITATION | Facility: HOSPITAL | Age: 33
End: 2023-03-03
Payer: COMMERCIAL

## 2023-03-03 DIAGNOSIS — M54.2 CERVICALGIA: Primary | ICD-10-CM

## 2023-03-03 PROCEDURE — 97110 THERAPEUTIC EXERCISES: CPT | Mod: PN

## 2023-03-03 PROCEDURE — 97140 MANUAL THERAPY 1/> REGIONS: CPT | Mod: PN

## 2023-03-03 NOTE — PROGRESS NOTES
Workers' Compensation Physical Therapy Daily Treatment Note     Name: Monica Middle Bass  Clinic Number: [id number]    Therapy Diagnosis:   Encounter Diagnosis   Name Primary?    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    SUBJECTIVE     Pt reports: that her home exercises feels much better as they are becoming easier and since her last visit she has been feeling the best since starting PT months ago.    She was compliant with home exercise program.  Response to previous treatment: no adverse effects  Function: was able to participate in work gift exchange     Pain: 1/10 neck  Location: Left sided neck pain    OBJECTIVE     Cervical AROM L rotation 75* , extension 62*, flexion 54*      Functional Job Specific Testing:      Job Specific Task Job Demands Current Ability Deficit? (Yes or No)   1. Sitting Constant Constant per pt with ergonomic changes as reported above No   2. Keying, Computer Work Frequent Constant per pt with ergonomic changes as reported above No   3. Driving Frequent Pt reports able to  No      TREATMENT   Monica received therapeutic exercises to develop strength, endurance, ROM,  "flexibility, and posture for 48 minutes including:    - L levator scap stretch 3x30 sec    Circuit: 2x  - Cervical Snag extension x10   - Cervical Snag L rotation x10     Thoracic Matrix 2x10  - Extension   - Rotation R and L     - R/L SL open book x10 with 5 sec hold  - Quadruped cat & camel x15 with 5 sec hold   - Prone plank plus to fatigue x3  - Prone WITTY x15 each    Circuit: 2x  Shoulder pull down contralateral cervical rotation with RTB anchored at wrists: x10/side  Unilateral row c/ contralateral cervical rotation with RTB anchored at wrists: x10/side    Monica received the following manual therapy techniques: Joint mobilizations, Manual traction, and Soft tissue Mobilization were applied to the: cervical regions for 10 minutes, including:  - Seated HVLAT to upper T spine with multiple cavitations noted  - Supine L UPA C5/6, C6/7  - Supine L LS stretching 4x30"  - Cervical distraction 3x45"   - STM to L middle trap/rhomboid, L upper trap, L levator scap  - scapular mobilizations in all directions  - R/L UPA/Upglides R/L C3/4 and C4/5 Gr III x 2       Home Exercises Provided: Patient instructed to cont prior HEP. Exercises were reviewed and Monica was able to demonstrate them prior to the end of the session.  Monica demonstrated good  understanding of the education provided. See EMR under Patient Instructions for exercises provided during therapy sessions    ASSESSMENT   Pt presented last visit for re-evaluation after being out of the clinic since 1/9/2023. She presented with s/s consistent with prior reports and presentation and today reported to the clinic noting feeling the best she has since starting PT a few months back. Reports marked improvements overall and has continued to keep up with her HEP as well as ergonomic changes to her workstation. She currently presents with limitations of cervical ROM at end ROM L rotation and extension with min LS mm restrictions. Mild limitations and JS of upper T spine " as well. Progressed periscpular activities and core stabilization activities as well per pt tolerance. Pt overall appears to have a great prognosis.      The patient's current job specific task deficits include the following: reaching to plug things into outlets, prolonged sitting as well (still has ergonomic changes made from last PT visit and is continuing to use [laptop stand, keyboard and mouse bluetooth])    Monica is making fair progress towards meeting set goals.   Pt prognosis is Good.     Pt will continue to benefit from skilled Physical Therapy interventions in order to address the deficits listed in the problem list box on initial evaluation, provide education, and to address the musculoskeletal limitations and work-related functional deficits for their job as a Clinical Supervisor for [employer] .    Pt's spiritual, cultural and educational needs considered and pt agreeable to plan of care and goals.     Anticipated barriers to physical therapy: chronicity of current injury    Goals: Short Term Goals: 3 weeks   1.) Pt will become compliant and independent with her initial HEP to promote decreased pain and improved mobility and strength. Met  2.) Pt will become compliant and independent with an updated, progressed HEP to promote decreased pain and improved mobility and strength as well as prep for discharge from further PT intervention.   3.) Pt to report decrease in pain levels from 10/10 at worse to 3/10 at worse. Met  4.) Pt will improve FOTO impairment rating from currently 38% to 36% impairment rating.   5.) Pt will improve FOTO Work FABQ score from elevated to low to indicate improved fear avoidance beliefs.   6.) Pt will demonstrate improved Left cervical rotation from 49* to 79* to demonstrate improved cervical Left rotation to equal contralateral rotation.   7.) Pt will improve cervical extension ROM from 48* to 60* to demonstrate improved ability to look up and scan environment. Met    Plan      Plan of care Certification: 4/7/2023     Outpatient Physical Therapy 3 times weekly for 3 weeks to include the following interventions: Manual Therapy, Moist Heat/ Ice, Neuromuscular Re-ed, Patient Education, Therapeutic Activities, and Therapeutic Exercise.      Upon discharge from further skilled PT intervention it will determined if the need for a work conditioning program or Functional Capacity Evaluation is required to allow the injured worker to return to work with full potential achieved, continued improvement with body mechanics with advanced functional activities, and further prevention of future work-related injuries.     Franco Rizo, PT   3/3/2023

## 2023-03-10 ENCOUNTER — CLINICAL SUPPORT (OUTPATIENT)
Dept: REHABILITATION | Facility: HOSPITAL | Age: 33
End: 2023-03-10
Payer: COMMERCIAL

## 2023-03-10 DIAGNOSIS — M54.2 CERVICALGIA: Primary | ICD-10-CM

## 2023-03-10 PROCEDURE — 97110 THERAPEUTIC EXERCISES: CPT | Mod: PN

## 2023-03-10 NOTE — PROGRESS NOTES
Workers' [employer] Physical Therapy Daily Treatment Note     Name: Monica Aparicio  [facility] Number: [id number]    Therapy Diagnosis:   Encounter Diagnosis   Name Primary?    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 Otherwise has been doing much better and keeping up with her home exercises.   She was compliant with home exercise program.  Response to previous treatment: no adverse effects  Function: no significant s/s with daily routine or working; has typical occasional work related stress and does not s/s tend to come back with elevated work stress levels, otherwise doing really well overall    Pain: 0/10 neck  Location: Left sided neck pain    OBJECTIVE     Cervical AROM screening with full R and L rotation, extension with no s/s; however mild pulling of the L LS with end ROM R rotation and flexion      Functional Job Specific Testing:      Job Specific  "Task Job Demands Current Ability Deficit? (Yes or No)   1. Sitting Constant Constant per pt with ergonomic changes as reported above No   2. Keying, Computer Work Frequent Constant per pt with ergonomic changes as reported above No   3. Driving Frequent Pt reports able to  No      TREATMENT   Monica received therapeutic exercises to develop strength, endurance, ROM, flexibility, and posture for 55 minutes including:    - L levator scap stretch 3x30 sec    Thoracic Matrix 2x10  - Extension   - Rotation R and L     - R/L SL open book x10 with 5 sec hold  - Quadruped cat & camel x15 with 5 sec hold   - Prone plank plus to fatigue x3  - Prone WITTY x15 each    Circuit: 2x  Shoulder pull down contralateral cervical rotation with RTB anchored at wrists: x10/side  Unilateral row c/ contralateral cervical rotation with RTB anchored at wrists: x10/side    Monica received the following manual therapy techniques: Joint mobilizations, Manual traction, and Soft tissue Mobilization were applied to the: cervical regions for 0 minutes, including:  - Seated HVLAT to upper T spine with multiple cavitations noted  - Supine L UPA C5/6, C6/7  - Supine L LS stretching 4x30"  - Cervical distraction 3x45"   - STM to L middle trap/rhomboid, L upper trap, L levator scap  - scapular mobilizations in all directions  - R/L UPA/Upglides R/L C3/4 and C4/5 Gr III x 2     Home Exercises Provided: Patient instructed to cont prior HEP. Exercises were reviewed and Monica was able to demonstrate them prior to the end of the session.  Monica demonstrated good  understanding of the education provided. See EMR under Patient Instructions for exercises provided during therapy sessions    ASSESSMENT   Pt presented 2/24/2023 for re-evaluation after being out of the clinic since 1/9/2023. She presented with s/s consistent with prior reports and presentation and today reported to the clinic noting no pain and overall feeling great. Though this is the case she did " note 1 episode of discomfort since her last visit; however, was able to manage with HEP. Screened cervical ROM in all planes today with ROM WNL! Though did have minor L sided discomfort with end ROM flexion R rotation about the levator scapulae. Controlled with L LS stretching. Re-test = no s/s post stretching. Reports marked improvements overall and has continued to keep up with her HEP as well as ergonomic changes to her workstation. Progressed periscpular activities and core stabilization activities as well per pt tolerance. Pt overall appears to have a great prognosis and FOTO goal has already been met.     The patient's current job specific task deficits include the following: reaching to plug things into outlets, prolonged sitting as well (still has ergonomic changes made from last PT visit and is continuing to use [laptop stand, keyboard and mouse bluetooth])    Monica is making fair progress towards meeting set goals.   Pt prognosis is Good.     Pt will continue to benefit from skilled Physical Therapy interventions in order to address the deficits listed in the problem list box on initial evaluation, provide education, and to address the musculoskeletal limitations and work-related functional deficits for their job as a Clinical Supervisor for [employer] .    Pt's spiritual, cultural and educational needs considered and pt agreeable to plan of care and goals.     Anticipated barriers to physical therapy: chronicity of current injury    Goals: Short Term Goals: 3 weeks   1.) Pt will become compliant and independent with her initial HEP to promote decreased pain and improved mobility and strength. Met  2.) Pt will become compliant and independent with an updated, progressed HEP to promote decreased pain and improved mobility and strength as well as prep for discharge from further PT intervention.   3.) Pt to report decrease in pain levels from 10/10 at worse to 3/10 at worse. Met  4.) Pt will improve FOTO  impairment rating from currently 38% to 36% impairment rating. Met  5.) Pt will improve FOTO Work FABQ score from elevated to low to indicate improved fear avoidance beliefs.   6.) Pt will demonstrate improved Left cervical rotation from 49* to 79* to demonstrate improved cervical Left rotation to equal contralateral rotation.   7.) Pt will improve cervical extension ROM from 48* to 60* to demonstrate improved ability to look up and scan environment. Met    Plan     Plan of care Certification: 4/7/2023     Outpatient Physical Therapy 3 times weekly for 3 weeks to include the following interventions: Manual Therapy, Moist Heat/ Ice, Neuromuscular Re-ed, Patient Education, Therapeutic Activities, and Therapeutic Exercise.      Upon discharge from further skilled PT intervention it will determined if the need for a work conditioning program or Functional Capacity Evaluation is required to allow the injured worker to return to work with full potential achieved, continued improvement with body mechanics with advanced functional activities, and further prevention of future work-related injuries.     Franco Rizo, PT   3/10/2023

## 2023-03-13 ENCOUNTER — OFFICE VISIT (OUTPATIENT)
Dept: OBSTETRICS AND GYNECOLOGY | Facility: CLINIC | Age: 33
End: 2023-03-13
Payer: COMMERCIAL

## 2023-03-13 VITALS
DIASTOLIC BLOOD PRESSURE: 76 MMHG | WEIGHT: 196.19 LBS | BODY MASS INDEX: 31.67 KG/M2 | SYSTOLIC BLOOD PRESSURE: 130 MMHG

## 2023-03-13 DIAGNOSIS — E21.3 HYPERPARATHYROIDISM: ICD-10-CM

## 2023-03-13 DIAGNOSIS — D25.9 UTERINE LEIOMYOMA, UNSPECIFIED LOCATION: ICD-10-CM

## 2023-03-13 DIAGNOSIS — E55.9 VITAMIN D DEFICIENCY: ICD-10-CM

## 2023-03-13 DIAGNOSIS — N92.0 MENORRHAGIA WITH REGULAR CYCLE: Primary | ICD-10-CM

## 2023-03-13 PROCEDURE — 3078F PR MOST RECENT DIASTOLIC BLOOD PRESSURE < 80 MM HG: ICD-10-PCS | Mod: CPTII,S$GLB,, | Performed by: OBSTETRICS & GYNECOLOGY

## 2023-03-13 PROCEDURE — 1160F RVW MEDS BY RX/DR IN RCRD: CPT | Mod: CPTII,S$GLB,, | Performed by: OBSTETRICS & GYNECOLOGY

## 2023-03-13 PROCEDURE — 1159F PR MEDICATION LIST DOCUMENTED IN MEDICAL RECORD: ICD-10-PCS | Mod: CPTII,S$GLB,, | Performed by: OBSTETRICS & GYNECOLOGY

## 2023-03-13 PROCEDURE — 99999 PR PBB SHADOW E&M-EST. PATIENT-LVL III: ICD-10-PCS | Mod: PBBFAC,,, | Performed by: OBSTETRICS & GYNECOLOGY

## 2023-03-13 PROCEDURE — 3075F SYST BP GE 130 - 139MM HG: CPT | Mod: CPTII,S$GLB,, | Performed by: OBSTETRICS & GYNECOLOGY

## 2023-03-13 PROCEDURE — 3008F PR BODY MASS INDEX (BMI) DOCUMENTED: ICD-10-PCS | Mod: CPTII,S$GLB,, | Performed by: OBSTETRICS & GYNECOLOGY

## 2023-03-13 PROCEDURE — 99999 PR PBB SHADOW E&M-EST. PATIENT-LVL III: CPT | Mod: PBBFAC,,, | Performed by: OBSTETRICS & GYNECOLOGY

## 2023-03-13 PROCEDURE — 99203 PR OFFICE/OUTPT VISIT, NEW, LEVL III, 30-44 MIN: ICD-10-PCS | Mod: S$GLB,,, | Performed by: OBSTETRICS & GYNECOLOGY

## 2023-03-13 PROCEDURE — 1160F PR REVIEW ALL MEDS BY PRESCRIBER/CLIN PHARMACIST DOCUMENTED: ICD-10-PCS | Mod: CPTII,S$GLB,, | Performed by: OBSTETRICS & GYNECOLOGY

## 2023-03-13 PROCEDURE — 1159F MED LIST DOCD IN RCRD: CPT | Mod: CPTII,S$GLB,, | Performed by: OBSTETRICS & GYNECOLOGY

## 2023-03-13 PROCEDURE — 3008F BODY MASS INDEX DOCD: CPT | Mod: CPTII,S$GLB,, | Performed by: OBSTETRICS & GYNECOLOGY

## 2023-03-13 PROCEDURE — 3075F PR MOST RECENT SYSTOLIC BLOOD PRESS GE 130-139MM HG: ICD-10-PCS | Mod: CPTII,S$GLB,, | Performed by: OBSTETRICS & GYNECOLOGY

## 2023-03-13 PROCEDURE — 99203 OFFICE O/P NEW LOW 30 MIN: CPT | Mod: S$GLB,,, | Performed by: OBSTETRICS & GYNECOLOGY

## 2023-03-13 PROCEDURE — 3078F DIAST BP <80 MM HG: CPT | Mod: CPTII,S$GLB,, | Performed by: OBSTETRICS & GYNECOLOGY

## 2023-03-13 RX ORDER — MELOXICAM 7.5 MG/1
7.5 TABLET ORAL 2 TIMES DAILY
COMMUNITY
Start: 2022-12-05

## 2023-03-13 RX ORDER — TIZANIDINE 4 MG/1
4 TABLET ORAL DAILY PRN
COMMUNITY
Start: 2023-02-16

## 2023-03-13 NOTE — PROGRESS NOTES
HISTORY OF PRESENT ILLNESS:    Monica Aparicio is a 32 y.o. female, , Patient's last menstrual period was 2023.,  presents for a new patient visit.     Jocelin at age 10 - cycles were irregular, lasting 7-14 days. Stopped OCPs in college in 2016 - reminedd irrgeualr on OCPS. Patient has taken OCPS, Depo, Ortho Evra   Depo for years secondary to painful cycles - unable to do daily activities on her cycle     Myomectomy in  - large posterior fibroid     She does not desire STD screening.    The patient participates in regular exercise: yes.    The patient does not smoke.    The patient wears seatbelts.     Pt denies any domestic violence.    Past Medical History:   Diagnosis Date    Anemia     Asthma     Diverticulitis     Hypercalcemia     Internal hemorrhoid     Spinal stenosis     Vaginismus        Past Surgical History:   Procedure Laterality Date    PLEURODESIS USING TALC      UTERINE FIBROID SURGERY  2021       MEDICATIONS AND ALLERGIES:      Current Outpatient Medications:     albuterol (ACCUNEB) 1.25 mg/3 mL Nebu, Take 1.25 mg by nebulization every 6 (six) hours as needed. Rescue, Disp: , Rfl:     albuterol (PROAIR HFA) 90 mcg/actuation inhaler, Inhale 2 puffs into the lungs every 6 (six) hours as needed for Wheezing. Rescue, Disp: 18 g, Rfl: 5    FLOVENT  mcg/actuation inhaler, Inhale 2 puffs into the lungs 2 (two) times a day., Disp: 12 g, Rfl: 5    meloxicam (MOBIC) 7.5 MG tablet, Take 7.5 mg by mouth 2 (two) times daily. as directed., Disp: , Rfl:     tiZANidine (ZANAFLEX) 4 MG tablet, Take 4 mg by mouth daily as needed., Disp: , Rfl:     pregabalin (LYRICA) 75 MG capsule, Take 1 capsule (75 mg total) by mouth 2 (two) times daily., Disp: 60 capsule, Rfl: 0    Review of patient's allergies indicates:   Allergen Reactions    Tramadol Hives, Nausea And Vomiting, Rash and Shortness Of Breath    Ibuprofen Hives and Rash    Pork/porcine containing products Diarrhea, Itching and  Rash       Family History   Problem Relation Age of Onset    Hypertension Mother     COPD Mother     Liver cancer Mother     Diabetes Mellitus Father        Social History     Socioeconomic History    Marital status: Single   Tobacco Use    Smoking status: Never    Smokeless tobacco: Never   Substance and Sexual Activity    Alcohol use: Not Currently    Drug use: Never       COMPREHENSIVE GYN HISTORY:  PAP History: Denies abnormal Paps.  Infection History: Denies STDs. Denies PID.  Benign History: Denies uterine fibroids. Denies ovarian cysts. Denies endometriosis. Denies other conditions.  Cancer History: Denies cervical cancer. Denies uterine cancer or hyperplasia. Denies ovarian cancer. Denies vulvar cancer or pre-cancer. Denies vaginal cancer or pre-cancer. Denies breast cancer. Denies colon cancer.  Sexual Activity History: Reports currently being sexually active  Menstrual History: Monthly. Mod then light flow.   Dysmenorrhea History: Reports mild dysmenorrhea.       ROS:  GENERAL: No weight changes. No swelling. No fatigue. No fever.  CARDIOVASCULAR: No chest pain. No shortness of breath. No leg cramps.   NEUROLOGICAL: No headaches. No vision changes.  BREASTS: No pain. No lumps. No discharge.  ABDOMEN: No pain. No nausea. No vomiting. No diarrhea. No constipation.  REPRODUCTIVE: No abnormal bleeding.   VULVA: No pain. No lesions. No itching.  VAGINA: No relaxation. No itching. No odor. No discharge. No lesions.  URINARY: No incontinence. No nocturia. No frequency. No dysuria.    /76 (BP Location: Left arm, Patient Position: Sitting, BP Method: Medium (Manual))   Wt 89 kg (196 lb 3.4 oz)   LMP 03/11/2023   BMI 31.67 kg/m²     PE:  APPEARANCE: Well nourished, well developed, in no acute distress.  AFFECT: WNL, alert and oriented x 3.  SKIN: No acne or hirsutism.  NECK: Neck symmetric, without masses or thyromegaly.  NODES: No inguinal, cervical, axillary or femoral lymph node enlargement.  CHEST: Good  respiratory effort.   ABDOMEN: Soft. No tenderness or masses. No hepatosplenomegaly. No hernias.  BREASTS: Symmetrical, no skin changes, visible lesions, palpable masses or nipple discharge bilaterally.  PELVIC: Deferred secondary to menses   EXTREMITIES: No edema    DIAGNOSIS:  1. Menorrhagia with regular cycle    2. Uterine leiomyoma, unspecified location    3. Hyperparathyroidism    4. Vitamin D deficiency      PLAN:    COUNSELING:  The patient was counseled today on:  -A.C.S. Pap and pelvic exam guidelines (pap every 3 years), recomendations for yearly mammogram;  -to follow up with her PCP for other health maintenance.    FOLLOW-UP with me for exam

## 2023-03-17 ENCOUNTER — CLINICAL SUPPORT (OUTPATIENT)
Dept: REHABILITATION | Facility: HOSPITAL | Age: 33
End: 2023-03-17
Payer: COMMERCIAL

## 2023-03-17 DIAGNOSIS — M54.2 CERVICALGIA: Primary | ICD-10-CM

## 2023-03-17 PROCEDURE — 97110 THERAPEUTIC EXERCISES: CPT | Mod: PN

## 2023-03-17 NOTE — PROGRESS NOTES
Workers' Compensation Physical Therapy Daily Treatment Note     Name: Monica Aparicio  [facility] Number: [id number]    Therapy Diagnosis:   Encounter Diagnosis   Name Primary?    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 Reports no significant no neck pain since her last visit and has been compliant to HEP and ergonomic changes.   She was compliant with home exercise program.  Response to previous treatment: no adverse effects  Function: no significant s/s with daily routine or working; has typical occasional work related stress and does not s/s tend to come back with elevated work stress levels, otherwise doing really well overall    Pain: 0/10 neck  Location: Left sided neck pain    OBJECTIVE     Cervical AROM screening with full R and L rotation, extension with no s/s; however mild pulling of the L LS with end ROM R rotation and flexion      Functional Job Specific Testing:      Job Specific Task Job Demands Current Ability Deficit?  "(Yes or No)   1. Sitting Constant Constant per pt with ergonomic changes as reported above No   2. Keying, Computer Work Frequent Constant per pt with ergonomic changes as reported above No   3. Driving Frequent Pt reports able to  No      TREATMENT   Monica received therapeutic exercises to develop strength, endurance, ROM, flexibility, and posture for 55 minutes including:    - L levator scap stretch 3x30 sec    Thoracic Matrix 2x10  - Extension   - Rotation R and L     - R/L SL open book x10 with 5 sec hold  + QP thread the needle 2x10 each  - Quadruped cat & camel x15 with 5 sec hold   - Prone plank plus to fatigue x3  - Prone WITTY x15 each    Circuit: 2x  Shoulder pull down contralateral cervical rotation with RTB anchored at wrists: x10/side  Unilateral row c/ contralateral cervical rotation with RTB anchored at wrists: x10/side    Monica received the following manual therapy techniques: Joint mobilizations, Manual traction, and Soft tissue Mobilization were applied to the: cervical regions for 0 minutes, including:  - Seated HVLAT to upper T spine with multiple cavitations noted  - Supine L UPA C5/6, C6/7  - Supine L LS stretching 4x30"  - Cervical distraction 3x45"   - STM to L middle trap/rhomboid, L upper trap, L levator scap  - scapular mobilizations in all directions  - R/L UPA/Upglides R/L C3/4 and C4/5 Gr III x 2     Home Exercises Provided: Patient instructed to cont prior HEP. Exercises were reviewed and Monica was able to demonstrate them prior to the end of the session.  Monica demonstrated good  understanding of the education provided. See EMR under Patient Instructions for exercises provided during therapy sessions    ASSESSMENT   Pt presented 2/24/2023 for re-evaluation after being out of the clinic since 1/9/2023. She presented with s/s consistent with prior reports and presentation and today reported to the clinic noting no pain and overall positive carry over between visits. Screened cervical " ROM in all planes with ROM WNL! Continues to report minor L sided discomfort with end ROM flexion R rotation about the levator scapulae. Controlled with L LS stretching and HEP/ergo break during her workday. Reports marked improvements overall and has continued to keep up with her HEP as well as ergonomic changes to her workstation. Progressed periscpular activities and core stabilization activities as well per pt tolerance. Pt overall appears to have a great prognosis and FOTO goal has already been met.     The patient's current job specific task deficits include the following: reaching to plug things into outlets, prolonged sitting as well (still has ergonomic changes made from last PT visit and is continuing to use [laptop stand, keyboard and mouse bluetooth])    Monica is making fair progress towards meeting set goals.   Pt prognosis is Good.     Pt will continue to benefit from skilled Physical Therapy interventions in order to address the deficits listed in the problem list box on initial evaluation, provide education, and to address the musculoskeletal limitations and work-related functional deficits for their job as a Clinical Supervisor for [employer] .    Pt's spiritual, cultural and educational needs considered and pt agreeable to plan of care and goals.     Anticipated barriers to physical therapy: chronicity of current injury    Goals: Short Term Goals: 3 weeks   1.) Pt will become compliant and independent with her initial HEP to promote decreased pain and improved mobility and strength. Met  2.) Pt will become compliant and independent with an updated, progressed HEP to promote decreased pain and improved mobility and strength as well as prep for discharge from further PT intervention.   3.) Pt to report decrease in pain levels from 10/10 at worse to 3/10 at worse. Met  4.) Pt will improve FOTO impairment rating from currently 38% to 36% impairment rating. Met  5.) Pt will improve FOTO Work FABQ  score from elevated to low to indicate improved fear avoidance beliefs.   6.) Pt will demonstrate improved Left cervical rotation from 49* to 79* to demonstrate improved cervical Left rotation to equal contralateral rotation.   7.) Pt will improve cervical extension ROM from 48* to 60* to demonstrate improved ability to look up and scan environment. Met    Plan     Plan of care Certification: 4/7/2023     Outpatient Physical Therapy 3 times weekly for 3 weeks to include the following interventions: Manual Therapy, Moist Heat/ Ice, Neuromuscular Re-ed, Patient Education, Therapeutic Activities, and Therapeutic Exercise.      Upon discharge from further skilled PT intervention it will determined if the need for a work conditioning program or Functional Capacity Evaluation is required to allow the injured worker to return to work with full potential achieved, continued improvement with body mechanics with advanced functional activities, and further prevention of future work-related injuries.     Franco Rizo, PT   3/19/2023

## 2023-03-22 ENCOUNTER — OFFICE VISIT (OUTPATIENT)
Dept: OBSTETRICS AND GYNECOLOGY | Facility: CLINIC | Age: 33
End: 2023-03-22
Payer: COMMERCIAL

## 2023-03-22 ENCOUNTER — HOSPITAL ENCOUNTER (OUTPATIENT)
Dept: RADIOLOGY | Facility: OTHER | Age: 33
Discharge: HOME | End: 2023-03-22
Attending: OBSTETRICS & GYNECOLOGY
Payer: COMMERCIAL

## 2023-03-22 VITALS
BODY MASS INDEX: 31.03 KG/M2 | SYSTOLIC BLOOD PRESSURE: 134 MMHG | DIASTOLIC BLOOD PRESSURE: 92 MMHG | WEIGHT: 192.25 LBS

## 2023-03-22 DIAGNOSIS — D25.9 UTERINE LEIOMYOMA, UNSPECIFIED LOCATION: ICD-10-CM

## 2023-03-22 DIAGNOSIS — Z11.3 SCREEN FOR STD (SEXUALLY TRANSMITTED DISEASE): ICD-10-CM

## 2023-03-22 DIAGNOSIS — N92.0 MENORRHAGIA WITH REGULAR CYCLE: ICD-10-CM

## 2023-03-22 DIAGNOSIS — N92.6 MENSTRUAL DISORDER: ICD-10-CM

## 2023-03-22 DIAGNOSIS — Z01.411 ENCOUNTER FOR GYNECOLOGICAL EXAMINATION WITH ABNORMAL FINDING: Primary | ICD-10-CM

## 2023-03-22 PROCEDURE — 81514 NFCT DS BV&VAGINITIS DNA ALG: CPT | Performed by: OBSTETRICS & GYNECOLOGY

## 2023-03-22 PROCEDURE — 3080F DIAST BP >= 90 MM HG: CPT | Mod: CPTII,S$GLB,, | Performed by: OBSTETRICS & GYNECOLOGY

## 2023-03-22 PROCEDURE — 99999 PR PBB SHADOW E&M-EST. PATIENT-LVL III: CPT | Mod: PBBFAC,,, | Performed by: OBSTETRICS & GYNECOLOGY

## 2023-03-22 PROCEDURE — 76856 US PELVIS COMP WITH TRANSVAG NON-OB (XPD): ICD-10-PCS | Mod: 26,,, | Performed by: RADIOLOGY

## 2023-03-22 PROCEDURE — 3075F SYST BP GE 130 - 139MM HG: CPT | Mod: CPTII,S$GLB,, | Performed by: OBSTETRICS & GYNECOLOGY

## 2023-03-22 PROCEDURE — 76830 TRANSVAGINAL US NON-OB: CPT | Mod: 26,,, | Performed by: RADIOLOGY

## 2023-03-22 PROCEDURE — 1160F RVW MEDS BY RX/DR IN RCRD: CPT | Mod: CPTII,S$GLB,, | Performed by: OBSTETRICS & GYNECOLOGY

## 2023-03-22 PROCEDURE — 99395 PR PREVENTIVE VISIT,EST,18-39: ICD-10-PCS | Mod: S$GLB,,, | Performed by: OBSTETRICS & GYNECOLOGY

## 2023-03-22 PROCEDURE — 99999 PR PBB SHADOW E&M-EST. PATIENT-LVL III: ICD-10-PCS | Mod: PBBFAC,,, | Performed by: OBSTETRICS & GYNECOLOGY

## 2023-03-22 PROCEDURE — 88175 CYTOPATH C/V AUTO FLUID REDO: CPT | Performed by: OBSTETRICS & GYNECOLOGY

## 2023-03-22 PROCEDURE — 87591 N.GONORRHOEAE DNA AMP PROB: CPT | Performed by: OBSTETRICS & GYNECOLOGY

## 2023-03-22 PROCEDURE — 76856 US EXAM PELVIC COMPLETE: CPT | Mod: 26,,, | Performed by: RADIOLOGY

## 2023-03-22 PROCEDURE — 3080F PR MOST RECENT DIASTOLIC BLOOD PRESSURE >= 90 MM HG: ICD-10-PCS | Mod: CPTII,S$GLB,, | Performed by: OBSTETRICS & GYNECOLOGY

## 2023-03-22 PROCEDURE — 1159F PR MEDICATION LIST DOCUMENTED IN MEDICAL RECORD: ICD-10-PCS | Mod: CPTII,S$GLB,, | Performed by: OBSTETRICS & GYNECOLOGY

## 2023-03-22 PROCEDURE — 76856 US EXAM PELVIC COMPLETE: CPT | Mod: TC

## 2023-03-22 PROCEDURE — 3075F PR MOST RECENT SYSTOLIC BLOOD PRESS GE 130-139MM HG: ICD-10-PCS | Mod: CPTII,S$GLB,, | Performed by: OBSTETRICS & GYNECOLOGY

## 2023-03-22 PROCEDURE — 87624 HPV HI-RISK TYP POOLED RSLT: CPT | Performed by: OBSTETRICS & GYNECOLOGY

## 2023-03-22 PROCEDURE — 76830 US PELVIS COMP WITH TRANSVAG NON-OB (XPD): ICD-10-PCS | Mod: 26,,, | Performed by: RADIOLOGY

## 2023-03-22 PROCEDURE — 1160F PR REVIEW ALL MEDS BY PRESCRIBER/CLIN PHARMACIST DOCUMENTED: ICD-10-PCS | Mod: CPTII,S$GLB,, | Performed by: OBSTETRICS & GYNECOLOGY

## 2023-03-22 PROCEDURE — 3008F BODY MASS INDEX DOCD: CPT | Mod: CPTII,S$GLB,, | Performed by: OBSTETRICS & GYNECOLOGY

## 2023-03-22 PROCEDURE — 1159F MED LIST DOCD IN RCRD: CPT | Mod: CPTII,S$GLB,, | Performed by: OBSTETRICS & GYNECOLOGY

## 2023-03-22 PROCEDURE — 3008F PR BODY MASS INDEX (BMI) DOCUMENTED: ICD-10-PCS | Mod: CPTII,S$GLB,, | Performed by: OBSTETRICS & GYNECOLOGY

## 2023-03-22 PROCEDURE — 99395 PREV VISIT EST AGE 18-39: CPT | Mod: S$GLB,,, | Performed by: OBSTETRICS & GYNECOLOGY

## 2023-03-22 RX ORDER — NORETHINDRONE ACETATE AND ETHINYL ESTRADIOL AND FERROUS FUMARATE 1MG-20(24)
1 KIT ORAL DAILY
Qty: 84 TABLET | Refills: 3 | Status: SHIPPED | OUTPATIENT
Start: 2023-03-22 | End: 2023-07-13

## 2023-03-22 NOTE — PROGRESS NOTES
HISTORY OF PRESENT ILLNESS:    Monica Aparicio is a 33 y.o. female, , Patient's last menstrual period was 2023.,  presents for pap & cultures for annual exam.   Today, patient reports a history of vaginismus, dicussed in detail.     Narrative & Impression  EXAMINATION:  US PELVIS COMP WITH TRANSVAG NON-OB (XPD)     CLINICAL HISTORY:  Excessive and frequent menstruation with regular cycle     TECHNIQUE:  Transabdominal sonography of the pelvis was performed, followed by transvaginal sonography to better evaluate the uterus and ovaries.     COMPARISON:  None.     FINDINGS:  Uterus:     Size: 8.2 x 3.7 x 4.4 cm     At least 3 small intramural and subserosal leiomyomas.  Largest is along the right aspect of the uterine body measuring 3.5 cm..  Endometrium is normal caliber measuring 0.8 cm.     Right ovary:     Size: 2.9 x 2.8 x 1.9 cm     Left ovary:     Size: 3.1 x 3.2 x 1.4 cm.  Corpus luteum measures 1.5 cm.     Free Fluid:     None.     Impression:     Intramural and subserosal leiomyomas measure up to 3.5 cm.        Electronically signed by: Daniela Sunshine  Date:                                            2023  Time:                                           15:50    Menarache at age 10 - cycles were irregular, lasting 7-14 days. Stopped OCPs in college in  - remained irregular on OCPS. Patient has taken OCPS, Depo, Ortho Evra   Depo for years secondary to painful cycles - unable to do daily activities on her cycle   Had blood transfusion for anemia in high school     Myomectomy in  - large posterior fibroid     Cycles are once a month, lasting 3 days using 3-4 overnight pads per day with clotting.     She does not desire STD screening.    The patient participates in regular exercise: yes.    The patient does not smoke.    The patient wears seatbelts.     Pt denies any domestic violence.    Past Medical History:   Diagnosis Date    Anemia     Asthma     Diverticulitis     Hypercalcemia      Internal hemorrhoid     Spinal stenosis     Vaginismus        Past Surgical History:   Procedure Laterality Date    PLEURODESIS USING TALC  2007    UTERINE FIBROID SURGERY  07/31/2021       MEDICATIONS AND ALLERGIES:      Current Outpatient Medications:     albuterol (ACCUNEB) 1.25 mg/3 mL Nebu, Take 1.25 mg by nebulization every 6 (six) hours as needed. Rescue, Disp: , Rfl:     albuterol (PROAIR HFA) 90 mcg/actuation inhaler, Inhale 2 puffs into the lungs every 6 (six) hours as needed for Wheezing. Rescue, Disp: 18 g, Rfl: 5    FLOVENT  mcg/actuation inhaler, Inhale 2 puffs into the lungs 2 (two) times a day., Disp: 12 g, Rfl: 5    meloxicam (MOBIC) 7.5 MG tablet, Take 7.5 mg by mouth 2 (two) times daily. as directed., Disp: , Rfl:     norethindrone-e.estradioL-iron (AUROVELA 24 FE) 1 mg-20 mcg (24)/75 mg (4) per tablet, Take 1 tablet by mouth once daily., Disp: 84 tablet, Rfl: 3    pregabalin (LYRICA) 75 MG capsule, Take 1 capsule (75 mg total) by mouth 2 (two) times daily., Disp: 60 capsule, Rfl: 0    tiZANidine (ZANAFLEX) 4 MG tablet, Take 4 mg by mouth daily as needed., Disp: , Rfl:     Review of patient's allergies indicates:   Allergen Reactions    Tramadol Hives, Nausea And Vomiting, Rash and Shortness Of Breath    Ibuprofen Hives and Rash    Pork/porcine containing products Diarrhea, Itching and Rash       Family History   Problem Relation Age of Onset    Hypertension Mother     COPD Mother     Liver cancer Mother     Diabetes Mellitus Father        Social History     Socioeconomic History    Marital status: Single   Tobacco Use    Smoking status: Never    Smokeless tobacco: Never   Substance and Sexual Activity    Alcohol use: Not Currently    Drug use: Never       COMPREHENSIVE GYN HISTORY:  PAP History: Denies abnormal Paps.  Infection History: Denies STDs. Denies PID.  Benign History: Denies uterine fibroids. Denies ovarian cysts. Denies endometriosis. Denies other conditions.  Cancer History:  Denies cervical cancer. Denies uterine cancer or hyperplasia. Denies ovarian cancer. Denies vulvar cancer or pre-cancer. Denies vaginal cancer or pre-cancer. Denies breast cancer. Denies colon cancer.  Sexual Activity History: Reports currently being sexually active  Menstrual History: Monthly. Mod then light flow.   Dysmenorrhea History: Reports mild dysmenorrhea.       ROS:  GENERAL: No weight changes. No swelling. No fatigue. No fever.  CARDIOVASCULAR: No chest pain. No shortness of breath. No leg cramps.   NEUROLOGICAL: No headaches. No vision changes.  BREASTS: No pain. No lumps. No discharge.  ABDOMEN: No pain. No nausea. No vomiting. No diarrhea. No constipation.  REPRODUCTIVE: No abnormal bleeding.   VULVA: No pain. No lesions. No itching.  VAGINA: No relaxation. No itching. No odor. No discharge. No lesions.  URINARY: No incontinence. No nocturia. No frequency. No dysuria.    BP (!) 134/92   Wt 87.2 kg (192 lb 3.9 oz)   LMP 03/11/2023   BMI 31.03 kg/m²     PE:  APPEARANCE: Well nourished, well developed, in no acute distress.  AFFECT: WNL, alert and oriented x 3.  SKIN: No acne or hirsutism.  NECK: Neck symmetric, without masses or thyromegaly.  NODES: No inguinal, cervical, axillary or femoral lymph node enlargement.  CHEST: Good respiratory effort.   ABDOMEN: Soft. No tenderness or masses. No hepatosplenomegaly. No hernias.  BREASTS: Symmetrical, no skin changes, visible lesions, palpable masses or nipple discharge bilaterally.  PELVIC: Deferred secondary to menses   EXTREMITIES: No edema    DIAGNOSIS:  1. Encounter for gynecological examination with abnormal finding    2. Menstrual disorder    3. Uterine leiomyoma, unspecified location    4. Screen for STD (sexually transmitted disease)    5. Menorrhagia with regular cycle      PLAN:    COUNSELING:  The patient was counseled today on:  -A.C.S. Pap and pelvic exam guidelines (pap every 3 years), recomendations for yearly mammogram;  -to follow up with  her PCP for other health maintenance.    FOLLOW-UP with me for BRITTANY

## 2023-03-23 LAB
C TRACH DNA SPEC QL NAA+PROBE: NOT DETECTED
N GONORRHOEA DNA SPEC QL NAA+PROBE: NOT DETECTED

## 2023-03-24 ENCOUNTER — CLINICAL SUPPORT (OUTPATIENT)
Dept: REHABILITATION | Facility: HOSPITAL | Age: 33
End: 2023-03-24
Payer: COMMERCIAL

## 2023-03-24 DIAGNOSIS — M54.2 CERVICALGIA: Primary | ICD-10-CM

## 2023-03-24 LAB
BACTERIAL VAGINOSIS DNA: NEGATIVE
CANDIDA GLABRATA DNA: NEGATIVE
CANDIDA KRUSEI DNA: NEGATIVE
CANDIDA RRNA VAG QL PROBE: NEGATIVE
T VAGINALIS RRNA GENITAL QL PROBE: NEGATIVE

## 2023-03-24 PROCEDURE — 97110 THERAPEUTIC EXERCISES: CPT | Mod: PN

## 2023-03-24 NOTE — PROGRESS NOTES
Workers' Compensation Physical Therapy Daily Treatment Note     Name: Monica Aparicio  [facility] Number: [id number]    Therapy Diagnosis:   Encounter Diagnosis   Name Primary?    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 Reports no significant no neck pain since her last visit and has been compliant to HEP and ergonomic changes.   She was compliant with home exercise program.  Response to previous treatment: no adverse effects  Function: no significant s/s with daily routine or working; has typical occasional work related stress and does not s/s tend to come back with elevated work stress levels, otherwise doing really well overall    Pain: 0/10 neck  Location: Left sided neck pain    OBJECTIVE     Cervical AROM screening with full R and L rotation, extension with no s/s; however mild pulling of the L LS with end ROM R rotation and flexion      Functional Job Specific Testing:      Job Specific Task Job Demands Current Ability  "Deficit? (Yes or No)   1. Sitting Constant Constant per pt with ergonomic changes as reported above No   2. Keying, Computer Work Frequent Constant per pt with ergonomic changes as reported above No   3. Driving Frequent Pt reports able to  No      TREATMENT   Monica received therapeutic exercises to develop strength, endurance, ROM, flexibility, and posture for 55 minutes including:    - L levator scap stretch 3x30 sec    Thoracic Matrix 2x10  - Extension   - Rotation R and L     - R/L SL open book x10 with 5 sec hold  - QP thread the needle 2x10 each  - Quadruped cat & camel x15 with 5 sec hold   + Quadruped bird dogs 2x10   - Prone plank plus to fatigue x3  - Prone WITTY x15 each    + Kneeling lat stretch 3x30"    Circuit: 2x  Shoulder pull down contralateral cervical rotation with RTB anchored at wrists: x10/side  Unilateral row c/ contralateral cervical rotation with RTB anchored at wrists: x10/side    Monica received the following manual therapy techniques: Joint mobilizations, Manual traction, and Soft tissue Mobilization were applied to the: cervical regions for 0 minutes, including:  - Seated HVLAT to upper T spine with multiple cavitations noted  - Supine L UPA C5/6, C6/7  - Supine L LS stretching 4x30"  - Cervical distraction 3x45"   - STM to L middle trap/rhomboid, L upper trap, L levator scap  - scapular mobilizations in all directions  - R/L UPA/Upglides R/L C3/4 and C4/5 Gr III x 2     Home Exercises Provided: Patient instructed to cont prior HEP. Exercises were reviewed and Monica was able to demonstrate them prior to the end of the session.  Monica demonstrated good  understanding of the education provided. See EMR under Patient Instructions for exercises provided during therapy sessions    ASSESSMENT   Pt presented 2/24/2023 for re-evaluation after being out of the clinic since 1/9/2023. She presented with s/s consistent with prior reports and presentation and today reported to the clinic noting no " pain and overall positive carry over between visits. Pt feels possibly ready for DC. Discussed updating HEP and sending copy to pt with plan to DC next visit. Screened cervical ROM in all planes with ROM WNL! Continues to report minor L sided discomfort with end ROM flexion R rotation about the levator scapulae. Controlled with L LS stretching and HEP/ergo break during her workday. Reports marked improvements overall and has continued to keep up with her HEP as well as ergonomic changes to her workstation. Progressed periscpular activities and core stabilization activities as well per pt tolerance. Pt overall appears to have a great prognosis and FOTO goal has already been met.     The patient's current job specific task deficits include the following: reaching to plug things into outlets, prolonged sitting as well (still has ergonomic changes made from last PT visit and is continuing to use [laptop stand, keyboard and mouse bluetooth])    Monica is making fair progress towards meeting set goals.   Pt prognosis is Good.     Pt will continue to benefit from skilled Physical Therapy interventions in order to address the deficits listed in the problem list box on initial evaluation, provide education, and to address the musculoskeletal limitations and work-related functional deficits for their job as a Clinical Supervisor for [employer] .    Pt's spiritual, cultural and educational needs considered and pt agreeable to plan of care and goals.     Anticipated barriers to physical therapy: chronicity of current injury    Goals: Short Term Goals: 3 weeks   1.) Pt will become compliant and independent with her initial HEP to promote decreased pain and improved mobility and strength. Met  2.) Pt will become compliant and independent with an updated, progressed HEP to promote decreased pain and improved mobility and strength as well as prep for discharge from further PT intervention.   3.) Pt to report decrease in pain  levels from 10/10 at worse to 3/10 at worse. Met  4.) Pt will improve FOTO impairment rating from currently 38% to 36% impairment rating. Met  5.) Pt will improve FOTO Work FABQ score from elevated to low to indicate improved fear avoidance beliefs.   6.) Pt will demonstrate improved Left cervical rotation from 49* to 79* to demonstrate improved cervical Left rotation to equal contralateral rotation.   7.) Pt will improve cervical extension ROM from 48* to 60* to demonstrate improved ability to look up and scan environment. Met    Plan     Plan of care Certification: 4/7/2023     Outpatient Physical Therapy 3 times weekly for 3 weeks to include the following interventions: Manual Therapy, Moist Heat/ Ice, Neuromuscular Re-ed, Patient Education, Therapeutic Activities, and Therapeutic Exercise.      Upon discharge from further skilled PT intervention it will determined if the need for a work conditioning program or Functional Capacity Evaluation is required to allow the injured worker to return to work with full potential achieved, continued improvement with body mechanics with advanced functional activities, and further prevention of future work-related injuries.     Franco Rizo, PT   3/26/2023

## 2023-03-28 LAB
HPV HR 12 DNA SPEC QL NAA+PROBE: NEGATIVE
HPV16 AG SPEC QL: NEGATIVE
HPV18 DNA SPEC QL NAA+PROBE: NEGATIVE

## 2023-03-29 LAB
FINAL PATHOLOGIC DIAGNOSIS: NORMAL
Lab: NORMAL

## 2023-04-04 ENCOUNTER — PATIENT MESSAGE (OUTPATIENT)
Dept: RESEARCH | Facility: HOSPITAL | Age: 33
End: 2023-04-04
Payer: COMMERCIAL

## 2023-04-06 ENCOUNTER — CLINICAL SUPPORT (OUTPATIENT)
Dept: REHABILITATION | Facility: HOSPITAL | Age: 33
End: 2023-04-06
Payer: COMMERCIAL

## 2023-04-06 DIAGNOSIS — M54.2 CERVICALGIA: Primary | ICD-10-CM

## 2023-04-06 PROCEDURE — 97110 THERAPEUTIC EXERCISES: CPT | Mod: PN

## 2023-04-06 NOTE — PATIENT INSTRUCTIONS
Access Code: [email or web address]  URL: [email or web address]  Date: 04/06/2023  Prepared by: Franco Rizo    Exercises  - Seated Levator Scapulae Stretch  - 1 x daily - 3 x weekly - 1 sets - 3 reps - 30 hold  - Sidelying Open Book Thoracic Lumbar Rotation and Extension  - 1 x daily - 3 x weekly - 2 sets - 10 reps  - Quadruped Full Range Thoracic Rotation with Reach  - 1 x daily - 3 x weekly - 2 sets - 10 reps  - Quadruped Thoracic Rotation - Reach Under  - 1 x daily - 3 x weekly - 2 sets - 10 reps  - Bird Dog  - 1 x daily - 3 x weekly - 2 sets - 10 reps  - Quadruped Cat Cow  - 1 x daily - 3 x weekly - 2 sets - 10 reps  - Prone Chest Stretch on Chair  - 1 x daily - 3 x weekly - 2 sets - 10 reps  - Prone W Scapular Retraction  - 1 x daily - 3 x weekly - 3 sets - 10 reps  - Prone Scapular Retraction  - 1 x daily - 3 x weekly - 3 sets - 10 reps  - Prone Shoulder Horizontal Abduction with Thumbs Up  - 1 x daily - 3 x weekly - 3 sets - 10 reps  - Standard Plank  - 1 x daily - 3 x weekly - 3 sets

## 2023-04-06 NOTE — PROGRESS NOTES
Workers' Compensation Physical Therapy Daily Treatment Note     Name: Monica Aparicio  [facility] Number: [id number]    Therapy Diagnosis:   Encounter Diagnosis   Name Primary?    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 Reports has been compliant to HEP and ergonomic changes at her workstation.    She was compliant with home exercise program.  Response to previous treatment: no adverse effects  Function: no significant s/s with daily routine or working; has typical occasional work related stress and does not s/s tend to come back with elevated work stress levels, otherwise doing really well overall    Pain: 0/10 neck  Location: Left sided neck pain    OBJECTIVE     4.6.2023:   - Cervical AROM screening with full in all planes of motion R and L rotation, R and L SB, flexion and extension; B shoulder ROM WNL with no issues      Functional Job Specific Testing:      Job  Specific Task Job Demands Current Ability Deficit? (Yes or No)   1. Sitting Constant Constant per pt with ergonomic changes as reported above No   2. Keying, Computer Work Frequent Constant per pt with ergonomic changes as reported above No   3. Driving Frequent Pt reports able to  No      TREATMENT   Monica received therapeutic exercises to develop strength, endurance, ROM, flexibility, and posture for 55 minutes including:    - L levator scap stretch 3x30 sec    Thoracic Matrix 2x10  - Extension   - Rotation R and L     - R/L SL open book x10 with 5 sec hold  - QP thread the needle 2x10 each  - Quadruped cat & camel x15 with 5 sec hold   - Quadruped bird dogs 2x10   - Prone plank plus to fatigue x3  - Prone WITTY x15 each    Home Exercises Provided: Patient instructed to cont prior HEP. Exercises were reviewed and Monica was able to demonstrate them prior to the end of the session.  Monica demonstrated good  understanding of the education provided. See EMR under Patient Instructions for exercises provided during therapy sessions    ASSESSMENT   Pt has now completed 6 of 6 authorized PT treatment sessions. Pt notes that she feels ready for DC from PT at this time; has been doing well with no significant issues about her cervical, thoracic, or shoulders. Reports has been compliant to HEP and ergonomic changes at her workstation as well.   Overall she has made significant progress with PT intervention overall and now demonstrates cervical, thoracic, and shoulder ROM WNL in all planes of motion and her pain levels are controlled. She has been working full duty with no signfiicant issues related to her musculoskeletal condition. Updated HEP today and sent to pt for maintenance purposes. At this time she is being Dc'd from skilled PT intervention to I with HEP.     Monica is making fair progress towards meeting set goals.   Pt prognosis is Good.     Pt's spiritual, cultural and educational needs considered and pt agreeable  to plan of care and goals.     Anticipated barriers to physical therapy: chronicity of current injury    Goals: Short Term Goals: 3 weeks   1.) Pt will become compliant and independent with her initial HEP to promote decreased pain and improved mobility and strength. Met  2.) Pt will become compliant and independent with an updated, progressed HEP to promote decreased pain and improved mobility and strength as well as prep for discharge from further PT intervention. Met  3.) Pt to report decrease in pain levels from 10/10 at worse to 3/10 at worse. Met  4.) Pt will improve FOTO impairment rating from currently 38% to 36% impairment rating. Met  5.) Pt will improve FOTO Work FABQ score from elevated to low to indicate improved fear avoidance beliefs. NT  6.) Pt will demonstrate improved Left cervical rotation from 49* to 79* to demonstrate improved cervical Left rotation to equal contralateral rotation. Met  7.) Pt will improve cervical extension ROM from 48* to 60* to demonstrate improved ability to look up and scan environment. Met    Plan     PT Dc'd to I with HEP.     Franco Rizo, PT   4/6/2023

## 2023-04-19 ENCOUNTER — PATIENT MESSAGE (OUTPATIENT)
Dept: RESEARCH | Facility: HOSPITAL | Age: 33
End: 2023-04-19
Payer: COMMERCIAL

## 2023-06-27 ENCOUNTER — TELEPHONE (OUTPATIENT)
Dept: OBSTETRICS AND GYNECOLOGY | Facility: CLINIC | Age: 33
End: 2023-06-27
Payer: COMMERCIAL

## 2023-06-27 NOTE — TELEPHONE ENCOUNTER
I attempted to call the put but her mailbox was full. I left a my chart message for the patient with the date and time of her apt.

## 2023-06-27 NOTE — TELEPHONE ENCOUNTER
----- Message from Savana Fortune sent at 6/27/2023  4:02 PM CDT -----  Name of Who is Calling: SHIMA LOPEZ [[id number]]              What is the request in detail: Patient requesting a call back to discuss visiting the ER on yesterday and will need to be seen within 7-14 days              Can the clinic reply by [facility]: No              What Number to Call Back if not in [facility]: [phone]

## 2023-07-06 ENCOUNTER — TELEPHONE (OUTPATIENT)
Dept: OBSTETRICS AND GYNECOLOGY | Facility: CLINIC | Age: 33
End: 2023-07-06
Payer: COMMERCIAL

## 2023-07-06 NOTE — TELEPHONE ENCOUNTER
----- Message from Kat Pardo MA sent at 7/6/2023  9:40 AM CDT -----  Name of Who is Calling:SHIMA LOPEZ [[id number]]                What is the request in detail: Pt has a appointment scheduled for 7/13 and is trying to see if it needs to be rescheduled and for what reason. Please assist.                Can the clinic reply by [facility]: No                What Number to Call Back if not in [facility]: [phone]

## 2023-07-13 ENCOUNTER — OFFICE VISIT (OUTPATIENT)
Dept: OBSTETRICS AND GYNECOLOGY | Facility: CLINIC | Age: 33
End: 2023-07-13
Payer: COMMERCIAL

## 2023-07-13 VITALS
HEIGHT: 66 IN | SYSTOLIC BLOOD PRESSURE: 118 MMHG | WEIGHT: 194 LBS | BODY MASS INDEX: 31.18 KG/M2 | DIASTOLIC BLOOD PRESSURE: 80 MMHG

## 2023-07-13 DIAGNOSIS — E21.3 HYPERPARATHYROIDISM: ICD-10-CM

## 2023-07-13 DIAGNOSIS — I10 PRIMARY HYPERTENSION: ICD-10-CM

## 2023-07-13 DIAGNOSIS — Z30.9 ENCOUNTER FOR CONTRACEPTIVE MANAGEMENT, UNSPECIFIED TYPE: Primary | ICD-10-CM

## 2023-07-13 DIAGNOSIS — N92.6 MENSTRUAL DISORDER: ICD-10-CM

## 2023-07-13 PROCEDURE — 99213 PR OFFICE/OUTPT VISIT, EST, LEVL III, 20-29 MIN: ICD-10-PCS | Mod: S$GLB,,, | Performed by: OBSTETRICS & GYNECOLOGY

## 2023-07-13 PROCEDURE — 99999 PR PBB SHADOW E&M-EST. PATIENT-LVL III: ICD-10-PCS | Mod: PBBFAC,,, | Performed by: OBSTETRICS & GYNECOLOGY

## 2023-07-13 PROCEDURE — 3079F PR MOST RECENT DIASTOLIC BLOOD PRESSURE 80-89 MM HG: ICD-10-PCS | Mod: CPTII,S$GLB,, | Performed by: OBSTETRICS & GYNECOLOGY

## 2023-07-13 PROCEDURE — 3074F SYST BP LT 130 MM HG: CPT | Mod: CPTII,S$GLB,, | Performed by: OBSTETRICS & GYNECOLOGY

## 2023-07-13 PROCEDURE — 1159F MED LIST DOCD IN RCRD: CPT | Mod: CPTII,S$GLB,, | Performed by: OBSTETRICS & GYNECOLOGY

## 2023-07-13 PROCEDURE — 1160F PR REVIEW ALL MEDS BY PRESCRIBER/CLIN PHARMACIST DOCUMENTED: ICD-10-PCS | Mod: CPTII,S$GLB,, | Performed by: OBSTETRICS & GYNECOLOGY

## 2023-07-13 PROCEDURE — 3008F PR BODY MASS INDEX (BMI) DOCUMENTED: ICD-10-PCS | Mod: CPTII,S$GLB,, | Performed by: OBSTETRICS & GYNECOLOGY

## 2023-07-13 PROCEDURE — 99213 OFFICE O/P EST LOW 20 MIN: CPT | Mod: S$GLB,,, | Performed by: OBSTETRICS & GYNECOLOGY

## 2023-07-13 PROCEDURE — 3008F BODY MASS INDEX DOCD: CPT | Mod: CPTII,S$GLB,, | Performed by: OBSTETRICS & GYNECOLOGY

## 2023-07-13 PROCEDURE — 99999 PR PBB SHADOW E&M-EST. PATIENT-LVL III: CPT | Mod: PBBFAC,,, | Performed by: OBSTETRICS & GYNECOLOGY

## 2023-07-13 PROCEDURE — 3074F PR MOST RECENT SYSTOLIC BLOOD PRESSURE < 130 MM HG: ICD-10-PCS | Mod: CPTII,S$GLB,, | Performed by: OBSTETRICS & GYNECOLOGY

## 2023-07-13 PROCEDURE — 1160F RVW MEDS BY RX/DR IN RCRD: CPT | Mod: CPTII,S$GLB,, | Performed by: OBSTETRICS & GYNECOLOGY

## 2023-07-13 PROCEDURE — 1159F PR MEDICATION LIST DOCUMENTED IN MEDICAL RECORD: ICD-10-PCS | Mod: CPTII,S$GLB,, | Performed by: OBSTETRICS & GYNECOLOGY

## 2023-07-13 PROCEDURE — 3079F DIAST BP 80-89 MM HG: CPT | Mod: CPTII,S$GLB,, | Performed by: OBSTETRICS & GYNECOLOGY

## 2023-07-13 RX ORDER — LACTIC ACID, L-, CITRIC ACID MONOHYDRATE, AND POTASSIUM BITARTRATE 90; 50; 20 MG/5G; MG/5G; MG/5G
1 GEL VAGINAL
Qty: 120 G | Refills: 3 | Status: SHIPPED | OUTPATIENT
Start: 2023-07-13

## 2023-07-13 NOTE — PROGRESS NOTES
HISTORY OF PRESENT ILLNESS:    Monica Aparicio is a 33 y.o. female  Patient's last menstrual period was 2023 (exact date). presents today complaining of   - US for UTI    US for UTI & Vag D?C   ED treated for UTI, Vag DC & STDs  Partner used Ivory soap, and she is sensitive to Ivory soap     Past Medical History:   Diagnosis Date    Anemia     Asthma     Diverticulitis     Family history of DVT     Family history of pulmonary embolism     Brother  form PE    Hypercalcemia     Internal hemorrhoid     Spinal stenosis     Vaginismus        Past Surgical History:   Procedure Laterality Date    PLEURODESIS USING TALC      UTERINE FIBROID SURGERY  2021       MEDICATIONS AND ALLERGIES:      Current Outpatient Medications:     albuterol (ACCUNEB) 1.25 mg/3 mL Nebu, Take 1.25 mg by nebulization every 6 (six) hours as needed. Rescue, Disp: , Rfl:     meloxicam (MOBIC) 7.5 MG tablet, Take 7.5 mg by mouth 2 (two) times daily. as directed., Disp: , Rfl:     tiZANidine (ZANAFLEX) 4 MG tablet, Take 4 mg by mouth daily as needed., Disp: , Rfl:     albuterol (PROAIR HFA) 90 mcg/actuation inhaler, Inhale 2 puffs into the lungs every 6 (six) hours as needed for Wheezing. Rescue, Disp: 18 g, Rfl: 11    cetirizine (ZYRTEC) 10 MG tablet, Take 1 tablet (10 mg total) by mouth once daily., Disp: 30 tablet, Rfl: 0    cyclobenzaprine (FLEXERIL) 5 MG tablet, Cyclobenzaprine HCl 5 MG Oral Tablet QTY: 30 tablet Days: 14 Refills: 0  Written: 22 Patient Instructions: as directed  - May take every 8 hours prn muscle spasms, Disp: , Rfl:     ergocalciferol (VITAMIN D2) 50,000 unit Cap, Take 1 capsule (50,000 Units total) by mouth every 7 days., Disp: 12 capsule, Rfl: 3    FLOVENT  mcg/actuation inhaler, Inhale 2 puffs into the lungs 2 (two) times a day., Disp: 12 g, Rfl: 11    lactic acid-citric-potassium (PHEXXI) 1.8-1-0.4 % Gel, Place 1 applicator vaginally as needed (Right before or up to one hour  "before sex)., Disp: 120 g, Rfl: 3    predniSONE (DELTASONE) 20 MG tablet, Take 1 tablet (20 mg total) by mouth 2 (two) times daily. for 5 days, Disp: 10 tablet, Rfl: 0    Review of patient's allergies indicates:   Allergen Reactions    Tramadol Hives, Nausea And Vomiting, Rash and Shortness Of Breath    Ibuprofen Hives and Rash    Pork/porcine containing products Diarrhea, Itching and Rash       COMPREHENSIVE GYN HISTORY:  PAP History: Denies abnormal Paps.  Infection History: Denies STDs. Denies PID.  Benign History: Denies uterine fibroids. Denies ovarian cysts. Denies endometriosis. Denies other conditions.  Cancer History: Denies cervical cancer. Denies uterine cancer or hyperplasia. Denies ovarian cancer. Denies vulvar cancer or pre-cancer. Denies vaginal cancer or pre-cancer. Denies breast cancer. Denies colon cancer.  Sexual Activity History: Reports currently being sexually active  Menstrual History: Every 28 days, flows for 4 days. Light flow.  Dysmenorrhea History: Denies dysmenorrhea.      ROS:  GENERAL: No fever or chills.  BREASTS: No pain. No lumps. No discharge.  ABDOMEN: No pain. No nausea. No vomiting. No diarrhea. No constipation.  REPRODUCTIVE: No abnormal bleeding.   VULVA: No pain. No lesions. No itching.  VAGINA: No relaxation. No itching. No odor. No discharge. No lesions.  URINARY: No incontinence. No nocturia. No frequency. No dysuria.    /80   Ht 5' 6" (1.676 m)   Wt 88 kg (194 lb 0.1 oz)   LMP 06/25/2023 (Exact Date)   BMI 31.31 kg/m²     PE:  APPEARANCE: Well nourished, well developed, in no acute distress.  AFFECT: WNL, alert and oriented x 3.  ABDOMEN: Soft. No tenderness or masses. No hepatosplenomegaly. No hernias.  BREASTS: Symmetrical, no skin changes or visible lesions. No palpable masses, nipple discharge bilaterally.  PELVIC: Normal external female genitalia without lesions. Normal hair distribution. Adequate perineal body, normal urethral meatus. Vagina pink and well " rugated without lesions or discharge. Cervix pink without lesions, discharge or tenderness. No significant cystocele or rectocele. Bimanual exam shows uterus to be 4-6 weeks size, regular, mobile and nontender. Adnexa without masses or tenderness.    PROCEDURES:    1. Encounter for contraceptive management, unspecified type    2. Primary hypertension    3. Menstrual disorder    4. Hyperparathyroidism        PLAN:    Orders Placed This Encounter    lactic acid-citric-potassium (PHEXXI) 1.8-1-0.4 % Gel       COUNSELING:  The patient was counseled today on:    I spent a total of 20 minutes on the day of the visit. addressing problems separate from annual exam.  This includes face to face time and non-face to face time preparing to see the patient (eg, review of tests), Obtaining and/or reviewing separately obtained history, Documenting clinical information in the electronic or other health record, Independently interpreting resultsand communicating results to the patient/family/caregiver, or Care coordination.     FOLLOW-UP with me in August

## 2023-07-13 NOTE — PATIENT INSTRUCTIONS
[email or web address].[email or web address]    The Phexxi® Difference  Phexxi is an innovative gel that works by maintaining vaginal pH-no hormones needed! Phexxi doesnt cause weight gain, mood swings, and blood clots, and is completely hormone-free. See important safety information below.    Phexxi is used in the moment, right before each and every act of sex. Once in the vagina, Phexxi works immediately and lasts up to an hour for each act of sex. No daily commitment required! Because not all women want birth control in their bodies when they arent having sex.    With Phexxi, youre always in control of your body and your pregnancy prevention. No need to rely on a partner to bring a condom, and no need to head into the doctors office for an injection or procedure.    Phexxi comes in a 4-inch applicator that is used similarly to a tampon. Once the applicator is inserted into the vagina, you push the gel in and remove the applicator.    Phexxi is used when you need it, and only when you need it. It is designed to stay in place in your vagina only when you're having sex, so Phexxi is flexible for family planning on your terms and timetable.    How Phexxi Works  Normal Vaginal pH  Vaginas typically have pH levels that  range from 3.5-4.5.    Phexxi is 93% effective at preventing pregnancy when used as directed.  Phexxi is 86% effective with typical use. But remember, typical use includes women who may not have correctly followed the instructions for use during the clinical trial.  In studies, less than 2% of women stopped using Phexxi because of side effects  While most women find Phexxi comfortable and easy to use, the most common side effects were vaginal burning, vaginal itching, vaginal yeast infection, urinary tract infection, vaginal area discomfort, bacterial vaginosis, and vaginal discharge. Women also reported genital discomfort, pain while urinating, and vaginal pain. Some male partners reported genital discomfort (including  burning, itching, and pain).    If you have had a history of repeated urinary tract infections or other urinary tract problems, you or your partner is allergic to any of the ingredients in Phexxi, or you are using a vaginal ring, avoid Phexxi.    When You Need It  How to Use Phexxi  Phexxi is used immediately before, or up to 1 hour before each act of sex.    IMPORTANT SAFETY INFORMATION  Rare cases (0.36%) of bladder and kidney infection have been reported. If you have a history of urinary tract problems that keep coming back, you should not use Phexxi®.

## 2023-07-21 ENCOUNTER — OFFICE VISIT (OUTPATIENT)
Dept: PRIMARY CARE CLINIC | Facility: CLINIC | Age: 33
End: 2023-07-21
Payer: COMMERCIAL

## 2023-07-21 ENCOUNTER — LAB VISIT (OUTPATIENT)
Dept: PRIMARY CARE CLINIC | Facility: CLINIC | Age: 33
End: 2023-07-21
Payer: COMMERCIAL

## 2023-07-21 VITALS
BODY MASS INDEX: 31.86 KG/M2 | SYSTOLIC BLOOD PRESSURE: 141 MMHG | WEIGHT: 197.44 LBS | DIASTOLIC BLOOD PRESSURE: 88 MMHG | HEART RATE: 66 BPM

## 2023-07-21 DIAGNOSIS — E21.3 HYPERPARATHYROIDISM: ICD-10-CM

## 2023-07-21 DIAGNOSIS — I10 PRIMARY HYPERTENSION: ICD-10-CM

## 2023-07-21 DIAGNOSIS — Z13.220 SCREENING CHOLESTEROL LEVEL: ICD-10-CM

## 2023-07-21 DIAGNOSIS — Z13.220 SCREENING CHOLESTEROL LEVEL: Primary | ICD-10-CM

## 2023-07-21 DIAGNOSIS — J45.40 MODERATE PERSISTENT ASTHMA WITHOUT COMPLICATION: ICD-10-CM

## 2023-07-21 DIAGNOSIS — Z00.00 ROUTINE HEALTH MAINTENANCE: ICD-10-CM

## 2023-07-21 DIAGNOSIS — E55.9 VITAMIN D DEFICIENCY: ICD-10-CM

## 2023-07-21 LAB
25(OH)D3+25(OH)D2 SERPL-MCNC: 12 NG/ML (ref 30–96)
ALBUMIN SERPL BCP-MCNC: 3.7 G/DL (ref 3.5–5.2)
ALP SERPL-CCNC: 60 U/L (ref 55–135)
ALT SERPL W/O P-5'-P-CCNC: 15 U/L (ref 10–44)
ANION GAP SERPL CALC-SCNC: 8 MMOL/L (ref 8–16)
AST SERPL-CCNC: 18 U/L (ref 10–40)
BASOPHILS # BLD AUTO: 0.08 K/UL (ref 0–0.2)
BASOPHILS NFR BLD: 1.3 % (ref 0–1.9)
BILIRUB SERPL-MCNC: 0.6 MG/DL (ref 0.1–1)
BUN SERPL-MCNC: 6 MG/DL (ref 6–20)
CALCIUM SERPL-MCNC: 10.4 MG/DL (ref 8.7–10.5)
CHLORIDE SERPL-SCNC: 107 MMOL/L (ref 95–110)
CHOLEST SERPL-MCNC: 192 MG/DL (ref 120–199)
CHOLEST/HDLC SERPL: 3.6 {RATIO} (ref 2–5)
CO2 SERPL-SCNC: 25 MMOL/L (ref 23–29)
CREAT SERPL-MCNC: 0.8 MG/DL (ref 0.5–1.4)
DIFFERENTIAL METHOD: ABNORMAL
EOSINOPHIL # BLD AUTO: 0.2 K/UL (ref 0–0.5)
EOSINOPHIL NFR BLD: 3.9 % (ref 0–8)
ERYTHROCYTE [DISTWIDTH] IN BLOOD BY AUTOMATED COUNT: 15.8 % (ref 11.5–14.5)
EST. GFR  (NO RACE VARIABLE): >60 ML/MIN/1.73 M^2
GLUCOSE SERPL-MCNC: 75 MG/DL (ref 70–110)
HCT VFR BLD AUTO: 39.6 % (ref 37–48.5)
HDLC SERPL-MCNC: 53 MG/DL (ref 40–75)
HDLC SERPL: 27.6 % (ref 20–50)
HGB BLD-MCNC: 12.8 G/DL (ref 12–16)
IMM GRANULOCYTES # BLD AUTO: 0.03 K/UL (ref 0–0.04)
IMM GRANULOCYTES NFR BLD AUTO: 0.5 % (ref 0–0.5)
LDLC SERPL CALC-MCNC: 125.4 MG/DL (ref 63–159)
LYMPHOCYTES # BLD AUTO: 2.3 K/UL (ref 1–4.8)
LYMPHOCYTES NFR BLD: 36.9 % (ref 18–48)
MCH RBC QN AUTO: 28.9 PG (ref 27–31)
MCHC RBC AUTO-ENTMCNC: 32.3 G/DL (ref 32–36)
MCV RBC AUTO: 89 FL (ref 82–98)
MONOCYTES # BLD AUTO: 0.8 K/UL (ref 0.3–1)
MONOCYTES NFR BLD: 12.9 % (ref 4–15)
NEUTROPHILS # BLD AUTO: 2.7 K/UL (ref 1.8–7.7)
NEUTROPHILS NFR BLD: 44.5 % (ref 38–73)
NONHDLC SERPL-MCNC: 139 MG/DL
NRBC BLD-RTO: 0 /100 WBC
PLATELET # BLD AUTO: 286 K/UL (ref 150–450)
PMV BLD AUTO: 12.1 FL (ref 9.2–12.9)
POTASSIUM SERPL-SCNC: 3.9 MMOL/L (ref 3.5–5.1)
PROT SERPL-MCNC: 7.7 G/DL (ref 6–8.4)
PTH-INTACT SERPL-MCNC: 117.6 PG/ML (ref 9–77)
RBC # BLD AUTO: 4.43 M/UL (ref 4–5.4)
SODIUM SERPL-SCNC: 140 MMOL/L (ref 136–145)
T4 FREE SERPL-MCNC: 1 NG/DL (ref 0.71–1.51)
TRIGL SERPL-MCNC: 68 MG/DL (ref 30–150)
TSH SERPL DL<=0.005 MIU/L-ACNC: 1.76 UIU/ML (ref 0.4–4)
WBC # BLD AUTO: 6.12 K/UL (ref 3.9–12.7)

## 2023-07-21 PROCEDURE — 99214 OFFICE O/P EST MOD 30 MIN: CPT | Mod: S$GLB,,, | Performed by: STUDENT IN AN ORGANIZED HEALTH CARE EDUCATION/TRAINING PROGRAM

## 2023-07-21 PROCEDURE — 83970 ASSAY OF PARATHORMONE: CPT | Performed by: STUDENT IN AN ORGANIZED HEALTH CARE EDUCATION/TRAINING PROGRAM

## 2023-07-21 PROCEDURE — 3077F SYST BP >= 140 MM HG: CPT | Mod: CPTII,S$GLB,, | Performed by: STUDENT IN AN ORGANIZED HEALTH CARE EDUCATION/TRAINING PROGRAM

## 2023-07-21 PROCEDURE — 80061 LIPID PANEL: CPT | Performed by: STUDENT IN AN ORGANIZED HEALTH CARE EDUCATION/TRAINING PROGRAM

## 2023-07-21 PROCEDURE — 3008F BODY MASS INDEX DOCD: CPT | Mod: CPTII,S$GLB,, | Performed by: STUDENT IN AN ORGANIZED HEALTH CARE EDUCATION/TRAINING PROGRAM

## 2023-07-21 PROCEDURE — 99999 PR PBB SHADOW E&M-EST. PATIENT-LVL III: CPT | Mod: PBBFAC,,, | Performed by: STUDENT IN AN ORGANIZED HEALTH CARE EDUCATION/TRAINING PROGRAM

## 2023-07-21 PROCEDURE — 80053 COMPREHEN METABOLIC PANEL: CPT | Performed by: STUDENT IN AN ORGANIZED HEALTH CARE EDUCATION/TRAINING PROGRAM

## 2023-07-21 PROCEDURE — 3079F PR MOST RECENT DIASTOLIC BLOOD PRESSURE 80-89 MM HG: ICD-10-PCS | Mod: CPTII,S$GLB,, | Performed by: STUDENT IN AN ORGANIZED HEALTH CARE EDUCATION/TRAINING PROGRAM

## 2023-07-21 PROCEDURE — 84443 ASSAY THYROID STIM HORMONE: CPT | Performed by: STUDENT IN AN ORGANIZED HEALTH CARE EDUCATION/TRAINING PROGRAM

## 2023-07-21 PROCEDURE — 3077F PR MOST RECENT SYSTOLIC BLOOD PRESSURE >= 140 MM HG: ICD-10-PCS | Mod: CPTII,S$GLB,, | Performed by: STUDENT IN AN ORGANIZED HEALTH CARE EDUCATION/TRAINING PROGRAM

## 2023-07-21 PROCEDURE — 84439 ASSAY OF FREE THYROXINE: CPT | Performed by: STUDENT IN AN ORGANIZED HEALTH CARE EDUCATION/TRAINING PROGRAM

## 2023-07-21 PROCEDURE — 82306 VITAMIN D 25 HYDROXY: CPT | Performed by: STUDENT IN AN ORGANIZED HEALTH CARE EDUCATION/TRAINING PROGRAM

## 2023-07-21 PROCEDURE — 85025 COMPLETE CBC W/AUTO DIFF WBC: CPT | Performed by: STUDENT IN AN ORGANIZED HEALTH CARE EDUCATION/TRAINING PROGRAM

## 2023-07-21 PROCEDURE — 1159F PR MEDICATION LIST DOCUMENTED IN MEDICAL RECORD: ICD-10-PCS | Mod: CPTII,S$GLB,, | Performed by: STUDENT IN AN ORGANIZED HEALTH CARE EDUCATION/TRAINING PROGRAM

## 2023-07-21 PROCEDURE — 3079F DIAST BP 80-89 MM HG: CPT | Mod: CPTII,S$GLB,, | Performed by: STUDENT IN AN ORGANIZED HEALTH CARE EDUCATION/TRAINING PROGRAM

## 2023-07-21 PROCEDURE — 1159F MED LIST DOCD IN RCRD: CPT | Mod: CPTII,S$GLB,, | Performed by: STUDENT IN AN ORGANIZED HEALTH CARE EDUCATION/TRAINING PROGRAM

## 2023-07-21 PROCEDURE — 3008F PR BODY MASS INDEX (BMI) DOCUMENTED: ICD-10-PCS | Mod: CPTII,S$GLB,, | Performed by: STUDENT IN AN ORGANIZED HEALTH CARE EDUCATION/TRAINING PROGRAM

## 2023-07-21 PROCEDURE — 99999 PR PBB SHADOW E&M-EST. PATIENT-LVL III: ICD-10-PCS | Mod: PBBFAC,,, | Performed by: STUDENT IN AN ORGANIZED HEALTH CARE EDUCATION/TRAINING PROGRAM

## 2023-07-21 PROCEDURE — 99214 PR OFFICE/OUTPT VISIT, EST, LEVL IV, 30-39 MIN: ICD-10-PCS | Mod: S$GLB,,, | Performed by: STUDENT IN AN ORGANIZED HEALTH CARE EDUCATION/TRAINING PROGRAM

## 2023-07-21 RX ORDER — ALBUTEROL SULFATE 90 UG/1
2 AEROSOL, METERED RESPIRATORY (INHALATION) EVERY 6 HOURS PRN
Qty: 18 G | Refills: 11 | Status: SHIPPED | OUTPATIENT
Start: 2023-07-21

## 2023-07-21 RX ORDER — CYCLOBENZAPRINE HCL 5 MG
TABLET ORAL
COMMUNITY
Start: 2022-06-07

## 2023-07-21 RX ORDER — FLUTICASONE PROPIONATE 220 UG/1
2 AEROSOL, METERED RESPIRATORY (INHALATION) 2 TIMES DAILY
Qty: 12 G | Refills: 11 | Status: SHIPPED | OUTPATIENT
Start: 2023-07-21

## 2023-07-21 NOTE — PROGRESS NOTES
07/21/2023    Monica Aparicio  [id number]    Chief Complaint   Patient presents with    hospitals Care       HPI    This patient is new to me and presents to est care/ medication refill. Pmh sig for HTN, hyperparathyroidism and asthma. Pt states that her out of state doctor will not refill her inhaler anymore and lately she needs it 2/2 possible mold.    asthma  Feels like she has mold in house and it's affecting her asthma.  Has needed to use nebulizer more since being out of flovent; 1x per week   Endorses nocturnal awakenings 2x week.  Has not heard wheezing, but has coughing fits that don't stop until she uses her nebulizer  No recent ED or UC visits    HTN  Hx of elevated blood pressure and has been up recently due to job stressors. Does not check at home.  Was in a study that monitored blood pressure for 6 weeks and recalls that her overall avg was elevate  Exercise: has found a gym     Hyperparathyroidism  Was getting worked up in [place] and was supposed to have surgery but moved before it happened. Has not seen endocrinology    Negative 10 point ROS outside of HPI    Social History     Socioeconomic History    Marital status: Single   Tobacco Use    Smoking status: Never    Smokeless tobacco: Never   Substance and Sexual Activity    Alcohol use: Not Currently    Drug use: Never           Current Outpatient Medications:     albuterol (ACCUNEB) 1.25 mg/3 mL Nebu, Take 1.25 mg by nebulization every 6 (six) hours as needed. Rescue, Disp: , Rfl:     albuterol (PROAIR HFA) 90 mcg/actuation inhaler, Inhale 2 puffs into the lungs every 6 (six) hours as needed for Wheezing. Rescue, Disp: 18 g, Rfl: 5    cyclobenzaprine (FLEXERIL) 5 MG tablet, Cyclobenzaprine HCl 5 MG Oral Tablet QTY: 30 tablet Days: 14 Refills: 0  Written: 06/07/22 Patient Instructions: as directed  - May take every 8 hours prn muscle spasms, Disp: , Rfl:     FLOVENT  mcg/actuation inhaler, Inhale 2 puffs into the lungs 2 (two) times a day., Disp:  12 g, Rfl: 5    lactic acid-citric-potassium (PHEXXI) 1.8-1-0.4 % Gel, Place 1 applicator vaginally as needed (Right before or up to one hour before sex)., Disp: 120 g, Rfl: 3    meloxicam (MOBIC) 7.5 MG tablet, Take 7.5 mg by mouth 2 (two) times daily. as directed., Disp: , Rfl:     tiZANidine (ZANAFLEX) 4 MG tablet, Take 4 mg by mouth daily as needed., Disp: , Rfl:       Physical Exam  Vitals:    07/21/23 1357   BP: (!) 141/88   Pulse: 66       GEN: NAD, AAox3, well nourished  HEENT: NCAT, EOMI, PEERL, no scleral injection, TM normal, moist mucous membranes, oropharynx clear, no erythema, no exudates  NECK: full rom, no cervical lymphadenopathy, no thyroidmegally  CV: RRR, no m/r/g, trace LE edema  LUNGS: CTAB, non-labored breathing, no wheezes, no crackles  ABD: soft, non-distended, no rebound/guarding, no organomegaly  EXT: n c/c/e, warm, 5/5 UE and LE strength  NEURO: CNII-XII intact no focal deficit  PSYCH: nl affect, no hallucinations, nl speech  Skin: intact, no rashes/lesions/erythema]    Problem List Items Addressed This Visit          Cardiac/Vascular    Primary hypertension     Just finished 6 week hypertension study and is scheduled for close follow up            Endocrine    Hyperparathyroidism     Repeat labs and referral to endocrinology         Relevant Orders    Comprehensive Metabolic Panel    PTH, intact    Vitamin D    Ambulatory referral/consult to Endocrinology    Vitamin D deficiency     Will send vitamin d if needed          Other Visit Diagnoses       Screening cholesterol level    -  Primary    Relevant Orders    Lipid Panel    Moderate persistent asthma without complication        Relevant Medications    albuterol (PROAIR HFA) 90 mcg/actuation inhaler    FLOVENT  mcg/actuation inhaler    Routine health maintenance        Relevant Orders    CBC Auto Differential    TSH    T4, Free            RTC pending workup    Evie Paz MD  Family Medicine

## 2023-07-24 RX ORDER — ERGOCALCIFEROL 1.25 MG/1
50000 CAPSULE ORAL
Qty: 12 CAPSULE | Refills: 3 | Status: SHIPPED | OUTPATIENT
Start: 2023-07-24 | End: 2024-07-23

## 2023-07-26 ENCOUNTER — TELEPHONE (OUTPATIENT)
Dept: ENDOCRINOLOGY | Facility: CLINIC | Age: 33
End: 2023-07-26
Payer: COMMERCIAL

## 2023-07-26 NOTE — TELEPHONE ENCOUNTER
----- Message from Asia Moy MA sent at 7/25/2023  4:51 PM CDT -----    ----- Message -----  From: Flakita Hearn MA  Sent: 7/25/2023   7:21 AM CDT  To: Asia Moy MA      ----- Message -----  From: Idalmis Conley  Sent: 7/24/2023   2:25 PM CDT  To: [facility] Staff    Please reach out to patient to schedule appt

## 2023-07-26 NOTE — TELEPHONE ENCOUNTER
Called patient to return her call , but patient food went straight to vm and couldn't lm due to it being full

## 2023-08-04 ENCOUNTER — OFFICE VISIT (OUTPATIENT)
Dept: URGENT CARE | Facility: CLINIC | Age: 33
End: 2023-08-04
Payer: COMMERCIAL

## 2023-08-04 VITALS
BODY MASS INDEX: 31.66 KG/M2 | SYSTOLIC BLOOD PRESSURE: 146 MMHG | TEMPERATURE: 99 F | DIASTOLIC BLOOD PRESSURE: 88 MMHG | HEIGHT: 66 IN | RESPIRATION RATE: 20 BRPM | HEART RATE: 77 BPM | OXYGEN SATURATION: 96 % | WEIGHT: 197 LBS

## 2023-08-04 DIAGNOSIS — R05.3 CHRONIC COUGH: Primary | ICD-10-CM

## 2023-08-04 DIAGNOSIS — J45.41 MODERATE PERSISTENT ASTHMA WITH ACUTE EXACERBATION: ICD-10-CM

## 2023-08-04 PROCEDURE — 99214 PR OFFICE/OUTPT VISIT, EST, LEVL IV, 30-39 MIN: ICD-10-PCS | Mod: S$GLB,,, | Performed by: NURSE PRACTITIONER

## 2023-08-04 PROCEDURE — 71046 XR CHEST PA AND LATERAL: ICD-10-PCS | Mod: FY,S$GLB,, | Performed by: RADIOLOGY

## 2023-08-04 PROCEDURE — 71046 X-RAY EXAM CHEST 2 VIEWS: CPT | Mod: FY,S$GLB,, | Performed by: RADIOLOGY

## 2023-08-04 PROCEDURE — 99214 OFFICE O/P EST MOD 30 MIN: CPT | Mod: S$GLB,,, | Performed by: NURSE PRACTITIONER

## 2023-08-04 RX ORDER — CETIRIZINE HYDROCHLORIDE 10 MG/1
10 TABLET ORAL DAILY
Qty: 30 TABLET | Refills: 0 | Status: SHIPPED | OUTPATIENT
Start: 2023-08-04 | End: 2024-08-03

## 2023-08-04 RX ORDER — PREDNISONE 20 MG/1
40 TABLET ORAL DAILY
Qty: 6 TABLET | Refills: 0 | Status: SHIPPED | OUTPATIENT
Start: 2023-08-04 | End: 2023-08-07

## 2023-08-04 NOTE — PROGRESS NOTES
"Subjective:      Patient ID: Monica Aparicio is a 33 y.o. female.    Vitals:  height is 5' 6" (1.676 m) and weight is 89.4 kg (197 lb). Her temperature is 98.5 °F (36.9 °C). Her blood pressure is 146/88 (abnormal) and her pulse is 77. Her respiration is 20 and oxygen saturation is 96%.     Chief Complaint: Shortness of Breath    This is a 33 y.o. female   who presents today with a chief complaint of shortness of breath two months ago. She states that she recently had mold removed from her house and is now experiencing shortness of breath and a cough. She's been using albuterol and a breathing machine to help relieve her symptoms.     Provider note begins below:    Ms Aparicio is a well appearing adult female in NAD. Patient with worsening difficulty speaking and cough.  Seen by her primary care provider previously but states since she was seen symptoms have worsened.  No fever recently.    Significant history asthma and states recent exposure to mold, as above.    Shortness of Breath  This is a new problem. The current episode started more than 1 month ago. The problem has been gradually worsening. Pertinent negatives include no abdominal pain, chest pain, claudication, coryza, ear pain, fever, headaches, hemoptysis, leg pain, leg swelling, neck pain, orthopnea, PND, rash, rhinorrhea, sore throat, sputum production, swollen glands, syncope, vomiting or wheezing. Nothing aggravates the symptoms. The patient has no known risk factors for DVT/PE.       Constitution: Negative for fever.   HENT:  Negative for ear pain and sore throat.    Neck: Negative for neck pain.   Cardiovascular:  Negative for chest pain, leg swelling and passing out.   Respiratory:  Positive for shortness of breath. Negative for sputum production, bloody sputum and wheezing.    Gastrointestinal:  Negative for abdominal pain and vomiting.   Skin:  Negative for rash.   Neurological:  Negative for headaches.      Objective:     Physical Exam "   Constitutional: She is oriented to person, place, and time. She appears well-developed. She is cooperative.  Non-toxic appearance. She does not appear ill. No distress.   HENT:   Head: Normocephalic and atraumatic.   Ears:   Right Ear: Hearing and external ear normal.   Left Ear: Hearing and external ear normal.   Nose: Nose normal. No mucosal edema, rhinorrhea or nasal deformity. No epistaxis. Right sinus exhibits no maxillary sinus tenderness and no frontal sinus tenderness. Left sinus exhibits no maxillary sinus tenderness and no frontal sinus tenderness.   Mouth/Throat: Uvula is midline, oropharynx is clear and moist and mucous membranes are normal. No trismus in the jaw. Normal dentition. No uvula swelling. Cobblestoning present. No oropharyngeal exudate, posterior oropharyngeal edema or posterior oropharyngeal erythema.   Eyes: Conjunctivae and lids are normal. No scleral icterus.   Neck: Trachea normal and phonation normal. Neck supple. No edema present. No erythema present. No neck rigidity present.   Cardiovascular: Normal rate, regular rhythm, normal heart sounds and normal pulses.   Pulmonary/Chest: Effort normal and breath sounds normal. No stridor. No respiratory distress. She has no decreased breath sounds. She has no wheezes. She has no rhonchi. She has no rales.           Abdominal: Normal appearance.   Musculoskeletal: Normal range of motion.         General: No deformity. Normal range of motion.   Neurological: She is alert and oriented to person, place, and time. She exhibits normal muscle tone. Coordination normal.   Skin: Skin is warm, dry, intact, not diaphoretic and not pale.   Psychiatric: Her speech is normal and behavior is normal. Judgment and thought content normal.   Nursing note and vitals reviewed.    X-Ray Chest PA And Lateral    Result Date: 8/4/2023  EXAMINATION: XR CHEST PA AND LATERAL CLINICAL HISTORY: Chronic cough TECHNIQUE: PA and lateral views of the chest were performed.  COMPARISON: CT scan of the chest dated 05/20/2022. FINDINGS: The trachea is unremarkable.  The cardiomediastinal silhouette is within normal limits.  The hilar structures are unremarkable.  There is no evidence of free air beneath the hemidiaphragms.  There are no pleural effusions.  There is no evidence of a pneumothorax.  There is no evidence of pneumomediastinum.  No airspace opacity is present.  The osseous structures are unremarkable.     No acute process. Electronically signed by: Jass Celaya MD Date:    08/04/2023 Time:    18:02     Assessment:     1. Chronic cough    2. Moderate persistent asthma with acute exacerbation        Plan:     Xrays ordered at this visit reviewed.     Chronic cough  -     X-Ray Chest PA And Lateral; Future; Expected date: 08/04/2023  -     cetirizine (ZYRTEC) 10 MG tablet; Take 1 tablet (10 mg total) by mouth once daily.  Dispense: 30 tablet; Refill: 0  -     predniSONE (DELTASONE) 20 MG tablet; Take 2 tablets (40 mg total) by mouth once daily. for 3 days  Dispense: 6 tablet; Refill: 0    Moderate persistent asthma with acute exacerbation  -     cetirizine (ZYRTEC) 10 MG tablet; Take 1 tablet (10 mg total) by mouth once daily.  Dispense: 30 tablet; Refill: 0  -     predniSONE (DELTASONE) 20 MG tablet; Take 2 tablets (40 mg total) by mouth once daily. for 3 days  Dispense: 6 tablet; Refill: 0

## 2023-08-16 ENCOUNTER — PATIENT MESSAGE (OUTPATIENT)
Dept: PRIMARY CARE CLINIC | Facility: CLINIC | Age: 33
End: 2023-08-16
Payer: COMMERCIAL

## 2023-08-17 ENCOUNTER — ON-DEMAND VIRTUAL (OUTPATIENT)
Dept: URGENT CARE | Facility: CLINIC | Age: 33
End: 2023-08-17
Payer: COMMERCIAL

## 2023-08-17 ENCOUNTER — TELEPHONE (OUTPATIENT)
Dept: PRIMARY CARE CLINIC | Facility: CLINIC | Age: 33
End: 2023-08-17
Payer: COMMERCIAL

## 2023-08-17 DIAGNOSIS — R06.2 WHEEZING: ICD-10-CM

## 2023-08-17 DIAGNOSIS — R05.3 CHRONIC COUGH: ICD-10-CM

## 2023-08-17 DIAGNOSIS — R06.02 SHORTNESS OF BREATH: Primary | ICD-10-CM

## 2023-08-17 PROCEDURE — 99213 PR OFFICE/OUTPT VISIT, EST, LEVL III, 20-29 MIN: ICD-10-PCS | Mod: 95,,, | Performed by: NURSE PRACTITIONER

## 2023-08-17 PROCEDURE — 99213 OFFICE O/P EST LOW 20 MIN: CPT | Mod: 95,,, | Performed by: NURSE PRACTITIONER

## 2023-08-17 RX ORDER — PREDNISONE 20 MG/1
20 TABLET ORAL 2 TIMES DAILY
Qty: 10 TABLET | Refills: 0 | Status: SHIPPED | OUTPATIENT
Start: 2023-08-17 | End: 2023-08-22

## 2023-08-17 NOTE — TELEPHONE ENCOUNTER
----- Message from Sofiya Keith sent at 8/17/2023  3:45 PM CDT -----  Contact: Deobrah  Requesting an RX refill or new RX.  Is this a refill or new RX: refill  RX name and strength  albuterol (ACCUNEB) 1.25 mg/3 mL Nebu solution  Is this a 30 day or 90 day RX:   Pharmacy name and phone #  [facility] [place], [place]  [address] [place] [address]   Phone:  [phone]  Fax:  [phone]         Patient is waiting for medication

## 2023-08-17 NOTE — TELEPHONE ENCOUNTER
----- Message from Sofiya Keith sent at 8/17/2023  3:45 PM CDT -----  Contact: Deborah  Requesting an RX refill or new RX.  Is this a refill or new RX: refill  RX name and strength  albuterol (ACCUNEB) 1.25 mg/3 mL Nebu solution  Is this a 30 day or 90 day RX:   Pharmacy name and phone #  [facility] [place], [place]  [address] [place] [address]   Phone:  [phone]  Fax:  [phone]         Patient is waiting for medication

## 2023-08-17 NOTE — TELEPHONE ENCOUNTER
----- Message from Hai Patel sent at 8/16/2023 11:59 AM CDT -----  Contact: [phone]  [email or web address]     Patient is calling for Medical Advice regarding: pt states she cant come in today but can you fit her in for tomorrow.     How long has patient had these symptoms:    Pharmacy name and phone#:    Would like response via ImmusanT:  doesn't matter     Comments:

## 2023-08-17 NOTE — PROGRESS NOTES
Subjective:      Patient ID: Monica Aparicio is a 33 y.o. female.    Vitals:  vitals were not taken for this visit.     Chief Complaint: Shortness of Breath, Medication Refill (Mold in her house. Seen in  23. CXR clear. Given Prednisone for 3 days. Symptoms returned. Unable to get in with PCP. Using MDI and neb tx with some relief.), and Cough      Visit Type: TELE AUDIOVISUAL    Present with the patient at the time of consultation: TELEMED PRESENT WITH PATIENT: None    Past Medical History:   Diagnosis Date    Anemia     Asthma     Diverticulitis     Family history of DVT     Family history of pulmonary embolism     Brother  form PE    Hypercalcemia     Internal hemorrhoid     Spinal stenosis     Vaginismus      Past Surgical History:   Procedure Laterality Date    PLEURODESIS USING TALC      UTERINE FIBROID SURGERY  2021     Review of patient's allergies indicates:   Allergen Reactions    Tramadol Hives, Nausea And Vomiting, Rash and Shortness Of Breath    Ibuprofen Hives and Rash    Pork/porcine containing products Diarrhea, Itching and Rash     Current Outpatient Medications on File Prior to Visit   Medication Sig Dispense Refill    albuterol (ACCUNEB) 1.25 mg/3 mL Nebu Take 1.25 mg by nebulization every 6 (six) hours as needed. Rescue      albuterol (PROAIR HFA) 90 mcg/actuation inhaler Inhale 2 puffs into the lungs every 6 (six) hours as needed for Wheezing. Rescue 18 g 11    cetirizine (ZYRTEC) 10 MG tablet Take 1 tablet (10 mg total) by mouth once daily. 30 tablet 0    cyclobenzaprine (FLEXERIL) 5 MG tablet Cyclobenzaprine HCl 5 MG Oral Tablet QTY: 30 tablet Days: 14 Refills: 0  Written: 22 Patient Instructions: as directed  - May take every 8 hours prn muscle spasms      ergocalciferol (VITAMIN D2) 50,000 unit Cap Take 1 capsule (50,000 Units total) by mouth every 7 days. 12 capsule 3    FLOVENT  mcg/actuation inhaler Inhale 2 puffs into the lungs 2 (two) times a day. (Patient  not taking: Reported on 8/4/2023) 12 g 11    lactic acid-citric-potassium (PHEXXI) 1.8-1-0.4 % Gel Place 1 applicator vaginally as needed (Right before or up to one hour before sex). 120 g 3    meloxicam (MOBIC) 7.5 MG tablet Take 7.5 mg by mouth 2 (two) times daily. as directed.      tiZANidine (ZANAFLEX) 4 MG tablet Take 4 mg by mouth daily as needed.       No current facility-administered medications on file prior to visit.     Family History   Problem Relation Age of Onset    Hypertension Mother     COPD Mother     Liver cancer Mother     Diabetes Mellitus Father        Medications Ordered                [id number] - [place] - [address]   [address] [address]    Telephone: [phone]   Fax: [phone]   Hours: Not open 24 hours                         E-Prescribed (1 of 1)              predniSONE (DELTASONE) 20 MG tablet    Sig: Take 1 tablet (20 mg total) by mouth 2 (two) times daily. for 5 days       Start: 8/17/23     Quantity: 10 tablet Refills: 0                           Oh[facility] Peq Odvv Intake    8/17/2023 10:55 AM CDT - Filed by Patient   Describe your reason for todays visit Prescription  refill   What is your current physical address in the event of a medical emergency?    Are you able to take your vital signs? No   Please attach any relevant images or files          Chronic cough and SOB.    Shortness of Breath  This is a recurrent problem. The problem occurs constantly. The problem has been unchanged. Associated symptoms include chest pain (tightness) and wheezing. Pertinent negatives include no fever. The symptoms are aggravated by any activity and pollens. She has tried oral steroids and ipratropium inhalers for the symptoms. The treatment provided mild relief. Her past medical history is significant for asthma.   Medication Refill  Associated symptoms include chest pain (tightness) and coughing. Pertinent negatives include no fever.   Cough  Associated  symptoms include chest pain (tightness), shortness of breath and wheezing. Pertinent negatives include no fever. Her past medical history is significant for asthma.       Constitution: Negative for fever.   HENT:  Negative for trouble swallowing.    Cardiovascular:  Positive for chest pain (tightness).   Respiratory:  Positive for chest tightness, cough, shortness of breath, wheezing and asthma. Negative for stridor.    Allergic/Immunologic: Positive for asthma.        Objective:   The physical exam was conducted virtually.  Physical Exam   Constitutional: She is oriented to person, place, and time. She does not appear ill. No distress.   HENT:   Head: Normocephalic and atraumatic.   Nose: Nose normal.   Eyes: Extraocular movement intact   Pulmonary/Chest: Effort normal.         Comments: Short of breath while talking    Abdominal: Normal appearance.   Musculoskeletal: Normal range of motion.         General: Normal range of motion.   Neurological: no focal deficit. She is alert and oriented to person, place, and time.   Psychiatric: Her behavior is normal. Mood normal.   Vitals reviewed.      Assessment:     1. Shortness of breath    2. Wheezing    3. Chronic cough        Plan:   Patient encouraged to monitor symptoms closely and instructed to follow-up for new or worsening symptoms. Further, in-person, evaluation may be necessary for continued treatment. Please follow up with your primary care doctor or specialist as needed. Verbally discussed plan. Patient confirms understanding and is in agreement with treatment and plan.     You must understand that you've received a Virtual Care evaluation only and that you may be released before all your medical problems are known or treated. You, the patient, will arrange for follow up care as instructed.      Shortness of breath  -     predniSONE (DELTASONE) 20 MG tablet; Take 1 tablet (20 mg total) by mouth 2 (two) times daily. for 5 days  Dispense: 10 tablet; Refill:  0    Wheezing  -     predniSONE (DELTASONE) 20 MG tablet; Take 1 tablet (20 mg total) by mouth 2 (two) times daily. for 5 days  Dispense: 10 tablet; Refill: 0    Chronic cough  -     predniSONE (DELTASONE) 20 MG tablet; Take 1 tablet (20 mg total) by mouth 2 (two) times daily. for 5 days  Dispense: 10 tablet; Refill: 0

## 2023-08-21 ENCOUNTER — OFFICE VISIT (OUTPATIENT)
Dept: OBSTETRICS AND GYNECOLOGY | Facility: CLINIC | Age: 33
End: 2023-08-21
Payer: COMMERCIAL

## 2023-08-21 ENCOUNTER — HOSPITAL ENCOUNTER (EMERGENCY)
Facility: HOSPITAL | Age: 33
Discharge: HOME | End: 2023-08-22
Attending: EMERGENCY MEDICINE
Payer: COMMERCIAL

## 2023-08-21 VITALS — BODY MASS INDEX: 31.66 KG/M2 | WEIGHT: 197 LBS | HEIGHT: 66 IN

## 2023-08-21 DIAGNOSIS — I10 PRIMARY HYPERTENSION: ICD-10-CM

## 2023-08-21 DIAGNOSIS — J45.901 EXACERBATION OF ASTHMA, UNSPECIFIED ASTHMA SEVERITY, UNSPECIFIED WHETHER PERSISTENT: ICD-10-CM

## 2023-08-21 DIAGNOSIS — R06.02 SHORTNESS OF BREATH: Primary | ICD-10-CM

## 2023-08-21 DIAGNOSIS — J90 PLEURAL EFFUSION ON LEFT: ICD-10-CM

## 2023-08-21 DIAGNOSIS — Z82.49 FAMILY HISTORY OF THROMBOEMBOLIC DISEASE: Primary | ICD-10-CM

## 2023-08-21 DIAGNOSIS — R79.89 POSITIVE D DIMER: ICD-10-CM

## 2023-08-21 LAB
B-HCG UR QL: NEGATIVE
BUN SERPL-MCNC: 9 MG/DL (ref 6–30)
CHLORIDE SERPL-SCNC: 105 MMOL/L (ref 95–110)
CREAT SERPL-MCNC: 0.5 MG/DL (ref 0.5–1.4)
CTP QC/QA: YES
D DIMER PPP IA.FEU-MCNC: 0.5 MG/L FEU
GLUCOSE SERPL-MCNC: 89 MG/DL (ref 70–110)
HCT VFR BLD CALC: 38 %PCV (ref 36–54)
POC IONIZED CALCIUM: 1.36 MMOL/L (ref 1.06–1.42)
POC TCO2 (MEASURED): 27 MMOL/L (ref 23–29)
POTASSIUM BLD-SCNC: 3.4 MMOL/L (ref 3.5–5.1)
SAMPLE: ABNORMAL
SODIUM BLD-SCNC: 141 MMOL/L (ref 136–145)

## 2023-08-21 PROCEDURE — 93010 ELECTROCARDIOGRAM REPORT: CPT | Mod: ,,, | Performed by: INTERNAL MEDICINE

## 2023-08-21 PROCEDURE — 1159F PR MEDICATION LIST DOCUMENTED IN MEDICAL RECORD: ICD-10-PCS | Mod: CPTII,95,, | Performed by: OBSTETRICS & GYNECOLOGY

## 2023-08-21 PROCEDURE — 1159F MED LIST DOCD IN RCRD: CPT | Mod: CPTII,95,, | Performed by: OBSTETRICS & GYNECOLOGY

## 2023-08-21 PROCEDURE — 94640 AIRWAY INHALATION TREATMENT: CPT | Mod: XB

## 2023-08-21 PROCEDURE — 94761 N-INVAS EAR/PLS OXIMETRY MLT: CPT

## 2023-08-21 PROCEDURE — 99285 EMERGENCY DEPT VISIT HI MDM: CPT | Mod: 25

## 2023-08-21 PROCEDURE — 93010 EKG 12-LEAD: ICD-10-PCS | Mod: ,,, | Performed by: INTERNAL MEDICINE

## 2023-08-21 PROCEDURE — 3008F BODY MASS INDEX DOCD: CPT | Mod: CPTII,95,, | Performed by: OBSTETRICS & GYNECOLOGY

## 2023-08-21 PROCEDURE — 85379 FIBRIN DEGRADATION QUANT: CPT | Performed by: PHYSICIAN ASSISTANT

## 2023-08-21 PROCEDURE — 99213 OFFICE O/P EST LOW 20 MIN: CPT | Mod: 95,,, | Performed by: OBSTETRICS & GYNECOLOGY

## 2023-08-21 PROCEDURE — 80048 BASIC METABOLIC PNL TOTAL CA: CPT

## 2023-08-21 PROCEDURE — 25000242 PHARM REV CODE 250 ALT 637 W/ HCPCS

## 2023-08-21 PROCEDURE — 25500020 PHARM REV CODE 255: Performed by: EMERGENCY MEDICINE

## 2023-08-21 PROCEDURE — 81025 URINE PREGNANCY TEST: CPT | Performed by: PHYSICIAN ASSISTANT

## 2023-08-21 PROCEDURE — 3008F PR BODY MASS INDEX (BMI) DOCUMENTED: ICD-10-PCS | Mod: CPTII,95,, | Performed by: OBSTETRICS & GYNECOLOGY

## 2023-08-21 PROCEDURE — 1160F PR REVIEW ALL MEDS BY PRESCRIBER/CLIN PHARMACIST DOCUMENTED: ICD-10-PCS | Mod: CPTII,95,, | Performed by: OBSTETRICS & GYNECOLOGY

## 2023-08-21 PROCEDURE — 25000242 PHARM REV CODE 250 ALT 637 W/ HCPCS: Performed by: PHYSICIAN ASSISTANT

## 2023-08-21 PROCEDURE — 1160F RVW MEDS BY RX/DR IN RCRD: CPT | Mod: CPTII,95,, | Performed by: OBSTETRICS & GYNECOLOGY

## 2023-08-21 PROCEDURE — 93005 ELECTROCARDIOGRAM TRACING: CPT

## 2023-08-21 PROCEDURE — 99213 PR OFFICE/OUTPT VISIT, EST, LEVL III, 20-29 MIN: ICD-10-PCS | Mod: 95,,, | Performed by: OBSTETRICS & GYNECOLOGY

## 2023-08-21 RX ORDER — IPRATROPIUM BROMIDE AND ALBUTEROL SULFATE 2.5; .5 MG/3ML; MG/3ML
3 SOLUTION RESPIRATORY (INHALATION)
Status: COMPLETED | OUTPATIENT
Start: 2023-08-21 | End: 2023-08-21

## 2023-08-21 RX ORDER — IPRATROPIUM BROMIDE AND ALBUTEROL SULFATE 2.5; .5 MG/3ML; MG/3ML
SOLUTION RESPIRATORY (INHALATION)
Status: COMPLETED
Start: 2023-08-21 | End: 2023-08-21

## 2023-08-21 RX ADMIN — IOHEXOL 75 ML: 350 INJECTION, SOLUTION INTRAVENOUS at 11:08

## 2023-08-21 RX ADMIN — IPRATROPIUM BROMIDE AND ALBUTEROL SULFATE 3 ML: .5; 3 SOLUTION RESPIRATORY (INHALATION) at 08:08

## 2023-08-21 NOTE — Clinical Note
"Monica"Dory Aparicio was seen and treated in our emergency department on 8/21/2023.  She may return to work on 08/24/2023.       If you have any questions or concerns, please don't hesitate to call.      Gabby Singletary PA-C"

## 2023-08-21 NOTE — PROGRESS NOTES
GYN TELEHEALTH VISIT      Patient reports today with complaints of contraception and family history of pulmonary embolism       The patient location is: [place], [place]   The chief complaint leading to consultation is: Follow up visit for contraception and family history of pulmonary embolism      Visit type: Virtual visit with synchronous audio and video  Total time spent with patient: 20 minutes (Over 50% of time is spent in counseling and coordination of care for contraception and family history of pulmonary embolism      COVID-19 precautions discussed in detail, precautions given. Patient urged to visit [facility] for latest health updates on COVID-19.   Each patient to whom he or she provides medical services by telemedicine is:  (1) informed of the relationship between the physician and patient and the respective role of any other health care provider with respect to management of the patient; and (2) notified that he or she may decline to receive medical services by telemedicine and may withdraw from such care at any time.    Monica Aparicio is a 33 y.o. female  Patient's last menstrual period was 2023 (exact date). presents today reporting contraception and family history of pulmonary embolism. (Brother, Mother, Maternal aunt)    Patient presents today for follow-up to discuss contraception secondary to her family history of thromboembolic disease.  Patient reports that brother had a DVT which traveled to the lungs and  from a pulmonary embolism and mother and her sister both had deep DVTs.  Patient reports that she is had a long history of menorrhagia with cycles occurring every 4 weeks lasting 3-5 days using 4-5 pads a day with occasional clotting.  Patient has used oral contraception ortho Evra patch and Depo-Provera with continued heavy cycles.    Patient is seen today for virtual visit however unable to communicate verbally secondary to allergic reaction to mole that is in her home.   She has significant shortness of breath and decreased pulmonary function secondary to the mold.  Patient was evaluated in the urgent care however is unable to leave her apartment.  Her family is in [place] and [place] and she has no other family here in the city that she can live with therefore she remains in the apartment.  I expressed significant concerns to patient and urged her to follow up with her PCP tomorrow or the ED immediately if her symptoms worsen.     Past Medical History:   Diagnosis Date    Anemia     Asthma     Diverticulitis     Family history of DVT     Family history of pulmonary embolism     Brother  form PE    Hypercalcemia     Internal hemorrhoid     Spinal stenosis     Vaginismus        Past Surgical History:   Procedure Laterality Date    PLEURODESIS USING TALC      UTERINE FIBROID SURGERY  2021       MEDICATIONS AND ALLERGIES:      Current Outpatient Medications:     albuterol (ACCUNEB) 1.25 mg/3 mL Nebu, Take 1.25 mg by nebulization every 6 (six) hours as needed. Rescue, Disp: , Rfl:     albuterol (PROAIR HFA) 90 mcg/actuation inhaler, Inhale 2 puffs into the lungs every 6 (six) hours as needed for Wheezing. Rescue, Disp: 18 g, Rfl: 11    cetirizine (ZYRTEC) 10 MG tablet, Take 1 tablet (10 mg total) by mouth once daily., Disp: 30 tablet, Rfl: 0    cyclobenzaprine (FLEXERIL) 5 MG tablet, Cyclobenzaprine HCl 5 MG Oral Tablet QTY: 30 tablet Days: 14 Refills: 0  Written: 22 Patient Instructions: as directed  - May take every 8 hours prn muscle spasms, Disp: , Rfl:     ergocalciferol (VITAMIN D2) 50,000 unit Cap, Take 1 capsule (50,000 Units total) by mouth every 7 days., Disp: 12 capsule, Rfl: 3    FLOVENT  mcg/actuation inhaler, Inhale 2 puffs into the lungs 2 (two) times a day., Disp: 12 g, Rfl: 11    lactic acid-citric-potassium (PHEXXI) 1.8-1-0.4 % Gel, Place 1 applicator vaginally as needed (Right before or up to one hour before sex)., Disp: 120 g, Rfl: 3     "meloxicam (MOBIC) 7.5 MG tablet, Take 7.5 mg by mouth 2 (two) times daily. as directed., Disp: , Rfl:     predniSONE (DELTASONE) 20 MG tablet, Take 1 tablet (20 mg total) by mouth 2 (two) times daily. for 5 days, Disp: 10 tablet, Rfl: 0    tiZANidine (ZANAFLEX) 4 MG tablet, Take 4 mg by mouth daily as needed., Disp: , Rfl:     Review of patient's allergies indicates:   Allergen Reactions    Tramadol Hives, Nausea And Vomiting, Rash and Shortness Of Breath    Ibuprofen Hives and Rash    Pork/porcine containing products Diarrhea, Itching and Rash       COMPREHENSIVE GYN HISTORY:  PAP History: Denies abnormal Paps.  Infection History: Denies STDs. Denies PID.  Benign History: Denies uterine fibroids. Denies ovarian cysts. Denies endometriosis. Denies other conditions.  Cancer History: Denies cervical cancer. Denies uterine cancer or hyperplasia. Denies ovarian cancer. Denies vulvar cancer or pre-cancer. Denies vaginal cancer or pre-cancer. Denies breast cancer. Denies colon cancer.  Sexual Activity History: Reports currently being sexually active  Menstrual History: Every 28 days, flows for 4 days. Light flow.  Dysmenorrhea History: Denies dysmenorrhea.    ROS:  GENERAL: No fever or chills.  BREASTS: No pain. No lumps. No discharge.  ABDOMEN: No pain. No nausea. No vomiting. No diarrhea. No constipation.  REPRODUCTIVE: No abnormal bleeding.   VULVA: No pain. No lesions. No itching.  VAGINA: No relaxation. No itching. No odor. No discharge. No lesions.  URINARY: No incontinence. No nocturia. No frequency. No dysuria.    Ht 5' 6" (1.676 m)   Wt 89.4 kg (197 lb)   LMP 07/22/2023 (Exact Date)   BMI 31.80 kg/m²     PE:  APPEARANCE: Well nourished, well developed, in no acute distress.  AFFECT: WNL, alert and oriented x 3.  Deferred    PROCEDURES:    1. Family history of thromboembolic disease    2. Primary hypertension        PLAN:      COUNSELING:  The patient was counseled today on:  PATIENT HAS EXTREME SHORTNESS OF " BREATH WHEEZING DIFFICULTY SPEAKING AND WHAT APPEARS TO BE WORSENING OF SYMPTOMS.  PATIENT SEEN IN URGENT CARE FOR ASTHMA EXACERBATION SECONDARY TO MOLD CLEAN UP IN HER HOME.  PATIENT REPORTS THAT SHE IS NOT GETTING BETTER AT THIS POINT.  SHE TOOK 3 DAYS OF STEROIDS WITH SOME IMPROVEMENT BUT ONCE THE STEROIDS IN DID THE SYMPTOMS RETURNED.  PATIENT UNABLE TO COMMUNICATE WITH ME DURING VIRTUAL VISIT NEEDING TO USE PAPER AND PEN TO WRITE DOWN HER WORDS.  AFTER REVIEW OF HER CHART I STRONGLY ENCOURAGED FOR PATIENT TO PRESENT TO THE [facility] EM[facility] ROOM WITH JENNY MO FOR IMMEDIATE EVALUATION.  SHE VOICED UNDERSTANDING AND APPRECIATION FOR CARE.  PATIENT IS SCHEDULED TO SEE HER PCP ON WEDNESDAY WILL SEND A MESSAGE TO DR. CHASE AS WELL       addressing problems separate from annual exam.  This includes face to face time and non-face to face time preparing to see the patient (eg, review of tests), Obtaining and/or reviewing separately obtained history, Documenting clinical information in the electronic or other health record, Independently interpreting resultsand communicating results to the patient/family/caregiver, or Care coordination.      FOLLOW-UP with me in OCTOBER

## 2023-08-22 VITALS
RESPIRATION RATE: 18 BRPM | SYSTOLIC BLOOD PRESSURE: 144 MMHG | BODY MASS INDEX: 31.8 KG/M2 | HEART RATE: 76 BPM | TEMPERATURE: 98 F | OXYGEN SATURATION: 98 % | WEIGHT: 197 LBS | DIASTOLIC BLOOD PRESSURE: 76 MMHG

## 2023-08-22 RX ORDER — PREDNISONE 20 MG/1
40 TABLET ORAL DAILY
Qty: 10 TABLET | Refills: 0 | Status: SHIPPED | OUTPATIENT
Start: 2023-08-22 | End: 2023-08-27

## 2023-08-22 RX ORDER — ALBUTEROL SULFATE 1.25 MG/3ML
1.25 SOLUTION RESPIRATORY (INHALATION) EVERY 6 HOURS PRN
Qty: 75 ML | Refills: 11 | Status: SHIPPED | OUTPATIENT
Start: 2023-08-22

## 2023-08-22 RX ORDER — ALBUTEROL SULFATE 2.5 MG/.5ML
2.5 SOLUTION RESPIRATORY (INHALATION) EVERY 4 HOURS PRN
Qty: 10 EACH | Refills: 0 | Status: SHIPPED | OUTPATIENT
Start: 2023-08-22 | End: 2024-08-21

## 2023-08-22 NOTE — ED PROVIDER NOTES
Encounter Date: 2023       History     Chief Complaint   Patient presents with    Mold     Pt. Not speaking in triage. Typed on her phone that she  believes she has been exposed to mold.      33 y.o. female with a PMHx of asthma, anemia, diverticulitis presents to the ED for shortness of breath x1 month. She recent moved to a building with mold. Hx of asthma since highschool. She went to urgent care approximately 2 weeks ago and was given albuterol and prednisone. She felt that her symptoms were improving but did not resolve. She has associate dry cough, pleuritic chest pain, and intermittent wheezing. She denies fever, sore throat, swelling, difficulty swallowing neck swelling, LE swelling.     The history is provided by the patient.     Review of patient's allergies indicates:   Allergen Reactions    Tramadol Hives, Nausea And Vomiting, Rash and Shortness Of Breath    Ibuprofen Hives and Rash    Pork/porcine containing products Diarrhea, Itching and Rash     Past Medical History:   Diagnosis Date    Anemia     Asthma     Diverticulitis     Family history of DVT     Family history of pulmonary embolism     Brother  form PE    Hypercalcemia     Internal hemorrhoid     Spinal stenosis     Vaginismus      Past Surgical History:   Procedure Laterality Date    PLEURODESIS USING TALC      UTERINE FIBROID SURGERY  2021     Family History   Problem Relation Age of Onset    Hypertension Mother     COPD Mother     Liver cancer Mother     Deep vein thrombosis Mother     Diabetes Mellitus Father     Pulmonary embolism Brother     Deep vein thrombosis Maternal Aunt      Social History     Tobacco Use    Smoking status: Never    Smokeless tobacco: Never   Substance Use Topics    Alcohol use: Not Currently    Drug use: Never     Review of Systems   Constitutional:  Negative for chills and fever.   HENT:  Negative for sore throat and trouble swallowing.    Respiratory:  Positive for cough, chest tightness,  shortness of breath and wheezing. Negative for stridor.    Cardiovascular:  Positive for chest pain. Negative for leg swelling.   Gastrointestinal:  Negative for nausea and vomiting.   Genitourinary:  Negative for dysuria.   Musculoskeletal:  Negative for back pain and neck pain.   Skin:  Negative for rash.   Neurological:  Negative for weakness.   Hematological:  Does not bruise/bleed easily.       Physical Exam     Initial Vitals [08/21/23 1757]   BP Pulse Resp Temp SpO2   (!) 135/96 91 18 98.6 °F (37 °C) 99 %      MAP       --         Physical Exam    Nursing note and vitals reviewed.  Constitutional: She appears well-developed and well-nourished. She is not diaphoretic. No distress.   HENT:   Head: Normocephalic and atraumatic.   Nose: Nose normal.   Mouth/Throat: No oropharyngeal exudate, posterior oropharyngeal edema or posterior oropharyngeal erythema.   Eyes: Conjunctivae and EOM are normal.   Neck: Neck supple. No stridor present.   Cardiovascular:  Normal rate, regular rhythm, normal heart sounds and intact distal pulses.           Pulmonary/Chest: Breath sounds normal. No respiratory distress. She has no wheezes.   Decrease air movement. Speaks in incomplete sentences       Musculoskeletal:      Cervical back: Neck supple. No edema.     Lymphadenopathy:     She has no cervical adenopathy.   Neurological: She is alert and oriented to person, place, and time. Gait normal.   Skin: No rash noted.   Psychiatric: She has a normal mood and affect. Her behavior is normal. Judgment and thought content normal.         ED Course   Procedures  Labs Reviewed   D DIMER, QUANTITATIVE - Abnormal; Notable for the following components:       Result Value    D-Dimer 0.50 (*)     All other components within normal limits   ISTAT PROCEDURE - Abnormal; Notable for the following components:    POC Potassium 3.4 (*)     All other components within normal limits   POCT URINE PREGNANCY   ISTAT CHEM8     EKG Readings:  (Independently Interpreted)   Initial Reading: No STEMI. Rhythm: Normal Sinus Rhythm. Heart Rate: 68. Ectopy: No Ectopy. Clinical Impression: Normal Sinus Rhythm       Imaging Results              CTA Chest Non-Coronary (PE Studies) (Final result)  Result time 08/21/23 23:44:32      Final result by Olivier Copeland MD (08/21/23 23:44:32)                   Impression:      1.  Technically limited study as discussed above. No evidence of central pulmonary embolus or convincing evidence of pulmonary thromboembolism to the proximal segmental levels.  Pulmonary embolus distal to the proximal segmental levels, particularly at the lung bases cannot be excluded. Correlation with d-dimer and lower extremity venous Doppler ultrasound as clinically warranted.    2.  Prominence of the main pulmonary artery measuring up to 3.5 cm which is nonspecific, although can be seen in the setting of pulmonary arterial hypertension.  Clinical correlation advised.    3.  Trace left-sided dependent pleural fluid as well as trace fluid along the right major fissure.      Electronically signed by: Olivier Copeland MD  Date:    08/21/2023  Time:    23:44               Narrative:    EXAMINATION:  CTA CHEST NON CORONARY (PE STUDIES)    CLINICAL HISTORY:  Pulmonary embolism (PE) suspected, positive D-dimer;    TECHNIQUE:  Low dose axial images, sagittal and coronal reformations were obtained from the thoracic inlet to the lung bases following the IV administration of 75 mL of Omnipaque 350.  Contrast timing was optimized to evaluate the pulmonary arteries.  MIP images were performed.    COMPARISON:  Chest radiograph 08/21/2023, CT chest 05/20/2022    FINDINGS:  Visualized soft tissue structures at the base of the neck appear within normal limits.    The thoracic aorta maintains normal caliber, contour, and course without significant atherosclerotic calcification within its course.  There is no evidence of aneurysmal dilation or dissection. The  "heart is not enlarged and there is no evidence of pericardial effusion.The esophagus maintains a normal course and caliber.No bulky axillary or mediastinal lymph node enlargement appreciated.    The trachea is midline and proximal airways are patent. Detailed evaluation of the lung parenchyma is limited by respiratory motion.  There is no pneumothorax.  There is mild bibasilar atelectasis.  There is trace fluid along the right major fissure.  There is a small volume of dependent pleural fluid at the left lung base.    Evaluation of the pulmonary arteries is limited secondary to respiratory motion artifact as well as streak artifact from dense contrast bolus in the SVC.  There is no large central saddle type pulmonary embolus or definite filling defect to the level of the proximal segmental arteries.  Evaluation more distally is significantly limited.  There is prominence of the main pulmonary artery measuring up to 3.5 cm which may relate to pulmonary arterial hypertension.    Limited images of the upper abdomen obtained during the course of this dedicated thoracic CT demonstrate no acute abnormalities.    The osseous structures no definite acute abnormality, noting assessment is limited by respiratory motion artifact.                                       X-Ray Chest PA And Lateral (Final result)  Result time 08/21/23 22:18:46      Final result by Darek Sloan MD (08/21/23 22:18:46)                   Impression:      Low lung volumes and mild bibasilar subsegmental atelectasis.  Otherwise, no detrimental change when compared with 08/04/2023.      Electronically signed by: Darek Sloan MD  Date:    08/21/2023  Time:    22:18               Narrative:    EXAMINATION:  XR CHEST PA AND LATERAL    CLINICAL HISTORY:  Provided history is "Asthma;  ".    TECHNIQUE:  Frontal and lateral views of the chest were performed.    COMPARISON:  08/04/2023.    FINDINGS:  Radiopaque clothing overlies the chest.  Cardiac " silhouette is not enlarged. Lung volumes are relatively low, accentuating basilar markings.  Mild bibasilar subsegmental atelectasis.  No confluent area of consolidation.  No sizable pleural effusion.  No pneumothorax.                                       Medications   albuterol-ipratropium 2.5 mg-0.5 mg/3 mL nebulizer solution 3 mL (3 mLs Nebulization Given 8/21/23 2024)   iohexoL (OMNIPAQUE 350) injection 100 mL (75 mLs Intravenous Given 8/21/23 2304)     Medical Decision Making  33-year-old female with pmhx of asthma, anemia, diverticulitis presents to the ED for shortness of breath x1 month. She is non-toxic appearing.  Vitals with hypertension. She speaks in incomplete sentences due to shortness of breath and cough. Lungs with decrease air movement. Will initiate workup and reassess     Differential diagnosis include asthma exacerbation, pneumonia, pleural effusion, pneumothorax, less likely PE but due to her family history of hypercoagulability (brother passed from PE and mother with hx of dvt) will obtain dimer to rule out life threatening condition    Patient notes improvement in her shortness of breath after DuoNeb treatments.  Able to speak in goldstein sentences post treatment. Suspect cause of shortness of breath related to asthma exacerbation.  Chest x-ray negative for pneumothorax, sizable pleural effusion, infiltrate. D-dimer elevated at 0.5. CTA chest limited due to respiratory motion though no evidence of central pulmonary embolism.  Low suspicion for pulmonary embolism.  PERC negative.  Patient is not tachypneic, tachycardic or hypoxic. Pulmonary artery prominence noted along with trace left-sided pleural effusion.  Referral given with pulmonology and instructed to follow-up with PCP for further evaluation.  Suspect further investigation may be needed including sleep study.  On reassessment patient is resting comfortably.  Hemodynamically stable.  SpO2 of 100%.  I believe she is stable at this time  for discharge.  ED precautions given to return for new, concerning, worsening symptoms.    Amount and/or Complexity of Data Reviewed  External Data Reviewed: notes.     Details: Urgent care visit 2 weeks ago  Labs: ordered. Decision-making details documented in ED Course.  Radiology: ordered.    Risk  Prescription drug management.               ED Course as of 08/22/23 0015   Mon Aug 21, 2023   2345 D-Dimer(!): 0.50 [HM]      ED Course User Index  [HM] Gabby Singletary PA-C                    Clinical Impression:   Final diagnoses:  [R06.02] Shortness of breath (Primary)  [J90] Pleural effusion on left  [R79.89] Positive D dimer  [J45.901] Exacerbation of asthma, unspecified asthma severity, unspecified whether persistent        ED Disposition Condition    Discharge Stable          ED Prescriptions       Medication Sig Dispense Start Date End Date Auth. Provider    predniSONE (DELTASONE) 20 MG tablet Take 2 tablets (40 mg total) by mouth once daily. for 5 days 10 tablet 8/22/2023 8/27/2023 Gabby Singletary PA-C    albuterol sulfate 2.5 mg/0.5 mL Nebu Take 2.5 mg by nebulization every 4 (four) hours as needed (shortness of breath, chest tightness or wheezing). Rescue 10 each 8/22/2023 8/21/2024 Gabby Singletary PA-C          Follow-up Information       Follow up With Specialties Details Why Contact Info Additional Information    Evie Paz MD Family Medicine In 1 week  [address] [facility] [address]  [phone]       [facility] - Emergency Dept Emergency Medicine  If symptoms worsen [address] [facility] [id number] [phone]     [facility] - Pulmonary [facility] 9th Fl Pulmonology   [address] [facility] [id number] [phone] Main Building, 9th Floor Please park in [facility] and use Clinic elevator             Gabby Singletary PA-C  08/22/23 0021

## 2023-08-22 NOTE — DISCHARGE INSTRUCTIONS
- Referral placed with pulmonology. Follow up as  discussed for shortness of breath.  If you are unable to get an appointment soon, please follow up with your primary care provider.  Small pleural effusion noted on the left side. You may need a sleep study or further testing to evaluate for pulmonary hypertension  - Continuous albuterol as needed for shortness of breath  - Return to the ED for new or worsening symptoms

## 2023-08-22 NOTE — ED NOTES
I-STAT Chem-8+ Results:   Value Reference Range   Sodium 141 136-145 mmol/L   Potassium  3.4 3.5-5.1 mmol/L   Chloride 105  mmol/L   Ionized Calcium 1.36 1.06-1.42 mmol/L   CO2 (measured) 27 23-29 mmol/L   Glucose 89  mg/dL   BUN 9 6-30 mg/dL   Creatinine 0.5 0.5-1.4 mg/dL   Hematocrit 38 36-54%

## 2023-08-22 NOTE — ED TRIAGE NOTES
Monica Aparicio, a 33 y.o. female presents to the ED w/ complaint of possible mold exposure. Pt typed out what has been happening. Patient stated she's been moving and believes she was exposed to mold and went to urgent care and was seen and given an inhaler to help with symptoms of cough shortness of breath, and has been taking an antihistamine. Pt endorsed throat pain, cough, shortness of breath, some chest pain that has been going on for longer than a week. Patient airway is open and patent.    Triage note:  Chief Complaint   Patient presents with    Mold     Pt. Not speaking in triage. Typed on her phone that she  believes she has been exposed to mold.      Review of patient's allergies indicates:   Allergen Reactions    Tramadol Hives, Nausea And Vomiting, Rash and Shortness Of Breath    Ibuprofen Hives and Rash    Pork/porcine containing products Diarrhea, Itching and Rash     Past Medical History:   Diagnosis Date    Anemia     Asthma     Diverticulitis     Family history of DVT     Family history of pulmonary embolism     Brother  form PE    Hypercalcemia     Internal hemorrhoid     Spinal stenosis     Vaginismus

## 2023-08-23 ENCOUNTER — OFFICE VISIT (OUTPATIENT)
Dept: PRIMARY CARE CLINIC | Facility: CLINIC | Age: 33
End: 2023-08-23
Payer: COMMERCIAL

## 2023-08-23 VITALS
DIASTOLIC BLOOD PRESSURE: 79 MMHG | HEART RATE: 88 BPM | SYSTOLIC BLOOD PRESSURE: 138 MMHG | OXYGEN SATURATION: 98 % | WEIGHT: 199.88 LBS | BODY MASS INDEX: 32.26 KG/M2

## 2023-08-23 DIAGNOSIS — J45.51 SEVERE PERSISTENT ASTHMA WITH EXACERBATION: Primary | ICD-10-CM

## 2023-08-23 PROCEDURE — 3008F BODY MASS INDEX DOCD: CPT | Mod: CPTII,S$GLB,, | Performed by: STUDENT IN AN ORGANIZED HEALTH CARE EDUCATION/TRAINING PROGRAM

## 2023-08-23 PROCEDURE — 1159F MED LIST DOCD IN RCRD: CPT | Mod: CPTII,S$GLB,, | Performed by: STUDENT IN AN ORGANIZED HEALTH CARE EDUCATION/TRAINING PROGRAM

## 2023-08-23 PROCEDURE — 3008F PR BODY MASS INDEX (BMI) DOCUMENTED: ICD-10-PCS | Mod: CPTII,S$GLB,, | Performed by: STUDENT IN AN ORGANIZED HEALTH CARE EDUCATION/TRAINING PROGRAM

## 2023-08-23 PROCEDURE — 99999 PR PBB SHADOW E&M-EST. PATIENT-LVL III: CPT | Mod: PBBFAC,,, | Performed by: STUDENT IN AN ORGANIZED HEALTH CARE EDUCATION/TRAINING PROGRAM

## 2023-08-23 PROCEDURE — 99214 OFFICE O/P EST MOD 30 MIN: CPT | Mod: S$GLB,,, | Performed by: STUDENT IN AN ORGANIZED HEALTH CARE EDUCATION/TRAINING PROGRAM

## 2023-08-23 PROCEDURE — 3075F PR MOST RECENT SYSTOLIC BLOOD PRESS GE 130-139MM HG: ICD-10-PCS | Mod: CPTII,S$GLB,, | Performed by: STUDENT IN AN ORGANIZED HEALTH CARE EDUCATION/TRAINING PROGRAM

## 2023-08-23 PROCEDURE — 1159F PR MEDICATION LIST DOCUMENTED IN MEDICAL RECORD: ICD-10-PCS | Mod: CPTII,S$GLB,, | Performed by: STUDENT IN AN ORGANIZED HEALTH CARE EDUCATION/TRAINING PROGRAM

## 2023-08-23 PROCEDURE — 99999 PR PBB SHADOW E&M-EST. PATIENT-LVL III: ICD-10-PCS | Mod: PBBFAC,,, | Performed by: STUDENT IN AN ORGANIZED HEALTH CARE EDUCATION/TRAINING PROGRAM

## 2023-08-23 PROCEDURE — 99214 PR OFFICE/OUTPT VISIT, EST, LEVL IV, 30-39 MIN: ICD-10-PCS | Mod: S$GLB,,, | Performed by: STUDENT IN AN ORGANIZED HEALTH CARE EDUCATION/TRAINING PROGRAM

## 2023-08-23 PROCEDURE — 3075F SYST BP GE 130 - 139MM HG: CPT | Mod: CPTII,S$GLB,, | Performed by: STUDENT IN AN ORGANIZED HEALTH CARE EDUCATION/TRAINING PROGRAM

## 2023-08-23 PROCEDURE — 3078F DIAST BP <80 MM HG: CPT | Mod: CPTII,S$GLB,, | Performed by: STUDENT IN AN ORGANIZED HEALTH CARE EDUCATION/TRAINING PROGRAM

## 2023-08-23 PROCEDURE — 3078F PR MOST RECENT DIASTOLIC BLOOD PRESSURE < 80 MM HG: ICD-10-PCS | Mod: CPTII,S$GLB,, | Performed by: STUDENT IN AN ORGANIZED HEALTH CARE EDUCATION/TRAINING PROGRAM

## 2023-08-23 RX ORDER — MONTELUKAST SODIUM 10 MG/1
10 TABLET ORAL NIGHTLY
Qty: 30 TABLET | Refills: 11 | Status: SHIPPED | OUTPATIENT
Start: 2023-08-23 | End: 2024-08-22

## 2023-08-23 RX ORDER — PREDNISONE 20 MG/1
TABLET ORAL
Qty: 6 TABLET | Refills: 0 | Status: SHIPPED | OUTPATIENT
Start: 2023-08-23 | End: 2023-09-01

## 2023-08-23 NOTE — PROGRESS NOTES
08/23/2023    Monica Aparicio  [id number]    Chief Complaint   Patient presents with    Follow-up     F/u from ER visit.        HPI    This pt is known to me and presents for Ed follow up. Active Dx: moderate persistent asthma and hyperparathyroidism    Has been dealing with worsened asthma since exposure to mold in apartment. Was seen in  8/4 8/17 and the emergency room 3 days ago for asthma. States that it have never been this bad. She is currently packing her things to get leave apartment, but finds that all her stuff is covered in mold. Currently on prednisone 40 mg daily for the second time. Albuterol Inhaler not helping as much or lasting as long. Will use nebulizer at night which helps sleep through the night. Does not have an air purifier      Negative 10 point ROS outside of HPI    Social History     Socioeconomic History    Marital status: Single   Tobacco Use    Smoking status: Never    Smokeless tobacco: Never   Substance and Sexual Activity    Alcohol use: Not Currently    Drug use: Never         Current Outpatient Medications:     albuterol (ACCUNEB) 1.25 mg/3 mL Nebu, Take 3 mLs (1.25 mg total) by nebulization every 6 (six) hours as needed. Rescue, Disp: 75 mL, Rfl: 11    albuterol (PROAIR HFA) 90 mcg/actuation inhaler, Inhale 2 puffs into the lungs every 6 (six) hours as needed for Wheezing. Rescue, Disp: 18 g, Rfl: 11    albuterol sulfate 2.5 mg/0.5 mL Nebu, Take 2.5 mg by nebulization every 4 (four) hours as needed (shortness of breath, chest tightness or wheezing). Rescue, Disp: 10 each, Rfl: 0    cetirizine (ZYRTEC) 10 MG tablet, Take 1 tablet (10 mg total) by mouth once daily., Disp: 30 tablet, Rfl: 0    cyclobenzaprine (FLEXERIL) 5 MG tablet, Cyclobenzaprine HCl 5 MG Oral Tablet QTY: 30 tablet Days: 14 Refills: 0  Written: 06/07/22 Patient Instructions: as directed  - May take every 8 hours prn muscle spasms, Disp: , Rfl:     ergocalciferol (VITAMIN D2) 50,000 unit Cap, Take 1 capsule (50,000  Units total) by mouth every 7 days., Disp: 12 capsule, Rfl: 3    FLOVENT  mcg/actuation inhaler, Inhale 2 puffs into the lungs 2 (two) times a day., Disp: 12 g, Rfl: 11    lactic acid-citric-potassium (PHEXXI) 1.8-1-0.4 % Gel, Place 1 applicator vaginally as needed (Right before or up to one hour before sex)., Disp: 120 g, Rfl: 3    meloxicam (MOBIC) 7.5 MG tablet, Take 7.5 mg by mouth 2 (two) times daily. as directed., Disp: , Rfl:     predniSONE (DELTASONE) 20 MG tablet, Take 2 tablets (40 mg total) by mouth once daily. for 5 days, Disp: 10 tablet, Rfl: 0    tiZANidine (ZANAFLEX) 4 MG tablet, Take 4 mg by mouth daily as needed., Disp: , Rfl:       Physical Exam  Vitals:    08/23/23 1531   BP: 138/79   Pulse: 88     Saturations 98%    Gen: well appearing, NAD  Resp: non labored breathing, decreased air movement, no wheezing. speaking in short sentences  CV: RRR no murmur, gallops, rubs, no LE edema  Abd: soft nontender BS present no organomegaly    1. Severe persistent asthma with exacerbation  Pt declines going back to the ED. Discussed low threshold to return given unable to speak in full sentences, but saturating 98% on room air. Discussed grave risk associated with uncontrolled asthma.  - Ambulatory referral/consult to Allergy; Future  - START montelukast (SINGULAIR) 10 mg tablet; Take 1 tablet (10 mg total) by mouth every evening.  Dispense: 30 tablet; Refill: 11  -TO START FOLLOWING CURRENT RX predniSONE (DELTASONE) 20 MG tablet; Take 1 tablet (20 mg total) by mouth once daily for 3 days, THEN 0.5 tablets (10 mg total) once daily for 5 days.  Dispense: 6 tablet; Refill: 0  -continue nebulizer q6 scheduled  -continue albuterol q2-4 PRN for SOB/Wheezing  -continue flovent 220 mcg BID     RTC in 10 days    Evie Paz MD  Family Medicine

## 2023-08-29 ENCOUNTER — PATIENT MESSAGE (OUTPATIENT)
Dept: PRIMARY CARE CLINIC | Facility: CLINIC | Age: 33
End: 2023-08-29
Payer: COMMERCIAL

## 2023-09-01 ENCOUNTER — OFFICE VISIT (OUTPATIENT)
Dept: ALLERGY | Facility: CLINIC | Age: 33
End: 2023-09-01
Payer: COMMERCIAL

## 2023-09-01 ENCOUNTER — LAB VISIT (OUTPATIENT)
Dept: LAB | Facility: HOSPITAL | Age: 33
End: 2023-09-01
Payer: COMMERCIAL

## 2023-09-01 VITALS — WEIGHT: 199.06 LBS | HEIGHT: 66 IN | BODY MASS INDEX: 31.99 KG/M2

## 2023-09-01 DIAGNOSIS — J31.0 CHRONIC RHINITIS: ICD-10-CM

## 2023-09-01 DIAGNOSIS — J45.51 SEVERE PERSISTENT ASTHMA WITH EXACERBATION: ICD-10-CM

## 2023-09-01 DIAGNOSIS — J31.0 CHRONIC RHINITIS: Primary | ICD-10-CM

## 2023-09-01 LAB
BASOPHILS # BLD AUTO: 0.1 K/UL (ref 0–0.2)
BASOPHILS NFR BLD: 1.4 % (ref 0–1.9)
DIFFERENTIAL METHOD: ABNORMAL
EOSINOPHIL # BLD AUTO: 0.2 K/UL (ref 0–0.5)
EOSINOPHIL NFR BLD: 3.3 % (ref 0–8)
ERYTHROCYTE [DISTWIDTH] IN BLOOD BY AUTOMATED COUNT: 14.6 % (ref 11.5–14.5)
HCT VFR BLD AUTO: 38 % (ref 37–48.5)
HGB BLD-MCNC: 12.2 G/DL (ref 12–16)
IMM GRANULOCYTES # BLD AUTO: 0.07 K/UL (ref 0–0.04)
IMM GRANULOCYTES NFR BLD AUTO: 1 % (ref 0–0.5)
LYMPHOCYTES # BLD AUTO: 2.8 K/UL (ref 1–4.8)
LYMPHOCYTES NFR BLD: 39.5 % (ref 18–48)
MCH RBC QN AUTO: 28 PG (ref 27–31)
MCHC RBC AUTO-ENTMCNC: 32.1 G/DL (ref 32–36)
MCV RBC AUTO: 87 FL (ref 82–98)
MONOCYTES # BLD AUTO: 0.9 K/UL (ref 0.3–1)
MONOCYTES NFR BLD: 12.8 % (ref 4–15)
NEUTROPHILS # BLD AUTO: 3 K/UL (ref 1.8–7.7)
NEUTROPHILS NFR BLD: 42 % (ref 38–73)
NRBC BLD-RTO: 0 /100 WBC
PLATELET # BLD AUTO: 275 K/UL (ref 150–450)
PMV BLD AUTO: 11.4 FL (ref 9.2–12.9)
RBC # BLD AUTO: 4.35 M/UL (ref 4–5.4)
WBC # BLD AUTO: 7.03 K/UL (ref 3.9–12.7)

## 2023-09-01 PROCEDURE — 99999 PR PBB SHADOW E&M-EST. PATIENT-LVL III: CPT | Mod: PBBFAC,,, | Performed by: ALLERGY & IMMUNOLOGY

## 2023-09-01 PROCEDURE — 99205 PR OFFICE/OUTPT VISIT, NEW, LEVL V, 60-74 MIN: ICD-10-PCS | Mod: S$GLB,,, | Performed by: ALLERGY & IMMUNOLOGY

## 2023-09-01 PROCEDURE — 99999 PR PBB SHADOW E&M-EST. PATIENT-LVL III: ICD-10-PCS | Mod: PBBFAC,,, | Performed by: ALLERGY & IMMUNOLOGY

## 2023-09-01 PROCEDURE — 99205 OFFICE O/P NEW HI 60 MIN: CPT | Mod: S$GLB,,, | Performed by: ALLERGY & IMMUNOLOGY

## 2023-09-01 PROCEDURE — 1159F PR MEDICATION LIST DOCUMENTED IN MEDICAL RECORD: ICD-10-PCS | Mod: CPTII,S$GLB,, | Performed by: ALLERGY & IMMUNOLOGY

## 2023-09-01 PROCEDURE — 3008F PR BODY MASS INDEX (BMI) DOCUMENTED: ICD-10-PCS | Mod: CPTII,S$GLB,, | Performed by: ALLERGY & IMMUNOLOGY

## 2023-09-01 PROCEDURE — 36415 COLL VENOUS BLD VENIPUNCTURE: CPT | Mod: PO | Performed by: ALLERGY & IMMUNOLOGY

## 2023-09-01 PROCEDURE — 86003 ALLG SPEC IGE CRUDE XTRC EA: CPT | Mod: 59 | Performed by: ALLERGY & IMMUNOLOGY

## 2023-09-01 PROCEDURE — 1160F RVW MEDS BY RX/DR IN RCRD: CPT | Mod: CPTII,S$GLB,, | Performed by: ALLERGY & IMMUNOLOGY

## 2023-09-01 PROCEDURE — 1160F PR REVIEW ALL MEDS BY PRESCRIBER/CLIN PHARMACIST DOCUMENTED: ICD-10-PCS | Mod: CPTII,S$GLB,, | Performed by: ALLERGY & IMMUNOLOGY

## 2023-09-01 PROCEDURE — 1159F MED LIST DOCD IN RCRD: CPT | Mod: CPTII,S$GLB,, | Performed by: ALLERGY & IMMUNOLOGY

## 2023-09-01 PROCEDURE — 3008F BODY MASS INDEX DOCD: CPT | Mod: CPTII,S$GLB,, | Performed by: ALLERGY & IMMUNOLOGY

## 2023-09-01 PROCEDURE — 85025 COMPLETE CBC W/AUTO DIFF WBC: CPT | Performed by: ALLERGY & IMMUNOLOGY

## 2023-09-01 PROCEDURE — 82785 ASSAY OF IGE: CPT | Performed by: ALLERGY & IMMUNOLOGY

## 2023-09-01 PROCEDURE — 86003 ALLG SPEC IGE CRUDE XTRC EA: CPT | Performed by: ALLERGY & IMMUNOLOGY

## 2023-09-01 RX ORDER — BUDESONIDE AND FORMOTEROL FUMARATE DIHYDRATE 160; 4.5 UG/1; UG/1
2 AEROSOL RESPIRATORY (INHALATION) EVERY 12 HOURS
Qty: 10.2 G | Refills: 12 | Status: SHIPPED | OUTPATIENT
Start: 2023-09-01 | End: 2024-08-31

## 2023-09-01 NOTE — PROGRESS NOTES
Subjective:       Patient ID: Monica Aparicio is a 33 y.o. female.    Chief Complaint:  Breathing Problem      32 yo woman presents for consult from Dr Evie Paz for asthma. She has had asthma since about the 10th grade, has been in hospital before but this is worst ever. She was living in a place with mold and thinks was the trigger. She has moved out. She is very SOB. Worse when talking. +SMITH. Does have cough, has tight chest and some wheeze.e saw PCP and was given 3rd round of steroids and montelukast. She developed HA, stomach pain, chest and back pain, heart racing and constipated. thought was from montelukast  So have stopped that. She has taken prednisone many times with no side effect sin past. She has some stuffy nose, PND, runny nose but no sneeze or itchy eyes or nose. She takes zyrtec daily. She is on Flovent 220 2 puff BID. Was on Q luke but insurance stopped paying. Tried a powder inhaler and made her worse. She is using albuterol neb BID and inhaler every 4 hours as needed. No season typically worse. No known food, insect or latex allergy. No other medical issues        Environmental History: see history section for home environment  Review of Systems   Constitutional:  Positive for appetite change and fatigue. Negative for chills and fever.   HENT:  Positive for congestion, postnasal drip and rhinorrhea. Negative for sinus pressure, sneezing and sore throat.    Eyes:  Negative for discharge, redness and itching.   Respiratory:  Positive for cough, chest tightness, shortness of breath and wheezing.    Cardiovascular:  Positive for chest pain and palpitations.   Gastrointestinal:  Positive for abdominal pain.   Musculoskeletal:  Positive for arthralgias and back pain.   Neurological:  Positive for headaches.        Objective:      Physical Exam  Vitals and nursing note reviewed.   Constitutional:       General: She is not in acute distress.     Appearance: Normal appearance. She is not ill-appearing.    HENT:      Nose: No rhinorrhea.   Eyes:      General:         Right eye: No discharge.         Left eye: No discharge.      Conjunctiva/sclera: Conjunctivae normal.   Pulmonary:      Effort: Respiratory distress present.      Breath sounds: Wheezing present.   Abdominal:      General: There is no distension.   Skin:     General: Skin is warm and dry.   Neurological:      Mental Status: She is alert and oriented to person, place, and time.   Psychiatric:         Mood and Affect: Mood normal.         Behavior: Behavior normal.         Laboratory:   none performed   Assessment:       1. Chronic rhinitis    2. Severe persistent asthma with exacerbation         Plan:       Asthma- pt with asthma since childhood uncontrolled. Will change Flovent to Symbicort as needs ICS/LABA but can not tolerate powder. Continue albuterol 2 puff every 4 ours or neb every 4 hours as needed  Reynolds gracie immunocaps to identify any allergic trigger.S continue zyrtec daily, may need to consider leukotriene blocker as montelukast did help. Suspect side effects were more from the amount of steroid she had but she strongly feels was montelukast so may need to try zafirlukast if needed  Check CBC and IgE level as if not better on Symbicort may need biologic  Phone review  Dr Paz notified of completed consult via Epic    I spent a total of 60 minutes on the day of the visit.  This includes face to face time and non-face to face time preparing to see the patient (eg, review of tests), obtaining and/or reviewing separately obtained history, documenting clinical information in the electronic or other health record, independently interpreting results and communicating results to the patient/family/caregiver, or care coordinator.

## 2023-09-05 LAB
A ALTERNATA IGE QN: <0.1 KU/L
A FUMIGATUS IGE QN: <0.1 KU/L
ALLERGEN CHAETOMIUM GLOBOSUM IGE: <0.1 KU/L
ALLERGEN WALNUT TREE IGE: <0.1 KU/L
ALLERGEN WHITE PINE TREE IGE: <0.1 KU/L
B CINEREA IGE QN: <0.1 KU/L
BAHIA GRASS IGE QN: <0.1 KU/L
BALD CYPRESS IGE QN: <0.1 KU/L
BERMUDA GRASS IGE QN: <0.1 KU/L
C HERBARUM IGE QN: <0.1 KU/L
C LUNATA IGE QN: <0.1 KU/L
CAT DANDER IGE QN: <0.1 KU/L
CHAETOMIUM GLOB. CLASS: NORMAL
COMMON RAGWEED IGE QN: <0.1 KU/L
COTTONWOOD IGE QN: <0.1 KU/L
D FARINAE IGE QN: 0.18 KU/L
D PTERONYSS IGE QN: 0.2 KU/L
DEPRECATED A ALTERNATA IGE RAST QL: NORMAL
DEPRECATED A FUMIGATUS IGE RAST QL: NORMAL
DEPRECATED B CINEREA IGE RAST QL: NORMAL
DEPRECATED BAHIA GRASS IGE RAST QL: NORMAL
DEPRECATED BALD CYPRESS IGE RAST QL: NORMAL
DEPRECATED BERMUDA GRASS IGE RAST QL: NORMAL
DEPRECATED C HERBARUM IGE RAST QL: NORMAL
DEPRECATED C LUNATA IGE RAST QL: NORMAL
DEPRECATED CAT DANDER IGE RAST QL: NORMAL
DEPRECATED COMMON RAGWEED IGE RAST QL: NORMAL
DEPRECATED COTTONWOOD IGE RAST QL: NORMAL
DEPRECATED D FARINAE IGE RAST QL: ABNORMAL
DEPRECATED D PTERONYSS IGE RAST QL: ABNORMAL
DEPRECATED DOG DANDER IGE RAST QL: NORMAL
DEPRECATED ELDER IGE RAST QL: NORMAL
DEPRECATED ENGL PLANTAIN IGE RAST QL: NORMAL
DEPRECATED HORSE DANDER IGE RAST QL: NORMAL
DEPRECATED JOHNSON GRASS IGE RAST QL: NORMAL
DEPRECATED LONDON PLANE IGE RAST QL: NORMAL
DEPRECATED MUGWORT IGE RAST QL: NORMAL
DEPRECATED P BETAE IGE RAST QL: NORMAL
DEPRECATED P NOTATUM IGE RAST QL: NORMAL
DEPRECATED PECAN/HICK TREE IGE RAST QL: NORMAL
DEPRECATED ROACH IGE RAST QL: NORMAL
DEPRECATED S ROSTRATA IGE RAST QL: NORMAL
DEPRECATED SALTWORT IGE RAST QL: NORMAL
DEPRECATED SILVER BIRCH IGE RAST QL: NORMAL
DEPRECATED TIMOTHY IGE RAST QL: NORMAL
DEPRECATED WEST RAGWEED IGE RAST QL: NORMAL
DEPRECATED WHITE OAK IGE RAST QL: NORMAL
DEPRECATED WILLOW IGE RAST QL: NORMAL
DOG DANDER IGE QN: <0.1 KU/L
ELDER IGE QN: <0.1 KU/L
ENGL PLANTAIN IGE QN: <0.1 KU/L
HORSE DANDER IGE QN: <0.1 KU/L
IGE SERPL-ACNC: 71 IU/ML (ref 0–100)
JOHNSON GRASS IGE QN: <0.1 KU/L
LONDON PLANE IGE QN: <0.1 KU/L
MUGWORT IGE QN: <0.1 KU/L
P BETAE IGE QN: <0.1 KU/L
P NOTATUM IGE QN: <0.1 KU/L
PECAN/HICK TREE IGE QN: <0.1 KU/L
ROACH IGE QN: <0.1 KU/L
S ROSTRATA IGE QN: <0.1 KU/L
SALTWORT IGE QN: <0.1 KU/L
SILVER BIRCH IGE QN: <0.1 KU/L
TIMOTHY IGE QN: <0.1 KU/L
WALNUT TREE CLASS: NORMAL
WEST RAGWEED IGE QN: <0.1 KU/L
WHITE OAK IGE QN: <0.1 KU/L
WHITE PINE CLASS: NORMAL
WILLOW IGE QN: <0.1 KU/L

## 2023-09-07 ENCOUNTER — PATIENT MESSAGE (OUTPATIENT)
Dept: ALLERGY | Facility: CLINIC | Age: 33
End: 2023-09-07
Payer: COMMERCIAL

## 2023-09-11 ENCOUNTER — PATIENT MESSAGE (OUTPATIENT)
Dept: LAB | Facility: HOSPITAL | Age: 33
End: 2023-09-11
Payer: COMMERCIAL

## 2023-10-11 ENCOUNTER — TELEPHONE (OUTPATIENT)
Dept: RESEARCH | Facility: HOSPITAL | Age: 33
End: 2023-10-11
Payer: COMMERCIAL

## 2023-11-08 ENCOUNTER — TELEPHONE (OUTPATIENT)
Dept: RESEARCH | Facility: HOSPITAL | Age: 33
End: 2023-11-08
Payer: COMMERCIAL

## 2023-11-14 ENCOUNTER — TELEPHONE (OUTPATIENT)
Dept: RESEARCH | Facility: HOSPITAL | Age: 33
End: 2023-11-14
Payer: COMMERCIAL

## 2023-12-02 ENCOUNTER — OFFICE VISIT (OUTPATIENT)
Dept: URGENT CARE | Facility: CLINIC | Age: 33
End: 2023-12-02
Payer: COMMERCIAL

## 2023-12-02 VITALS
HEART RATE: 76 BPM | HEIGHT: 66 IN | RESPIRATION RATE: 18 BRPM | BODY MASS INDEX: 31.98 KG/M2 | TEMPERATURE: 98 F | WEIGHT: 199 LBS | DIASTOLIC BLOOD PRESSURE: 86 MMHG | SYSTOLIC BLOOD PRESSURE: 135 MMHG

## 2023-12-02 DIAGNOSIS — J20.9 ACUTE BRONCHITIS, UNSPECIFIED ORGANISM: Primary | ICD-10-CM

## 2023-12-02 DIAGNOSIS — H65.193 ACUTE EFFUSION OF BOTH MIDDLE EARS: ICD-10-CM

## 2023-12-02 LAB
CTP QC/QA: YES
SARS-COV-2 AG RESP QL IA.RAPID: NEGATIVE

## 2023-12-02 PROCEDURE — 99213 OFFICE O/P EST LOW 20 MIN: CPT | Mod: S$GLB,,, | Performed by: PHYSICIAN ASSISTANT

## 2023-12-02 PROCEDURE — 99213 PR OFFICE/OUTPT VISIT, EST, LEVL III, 20-29 MIN: ICD-10-PCS | Mod: S$GLB,,, | Performed by: PHYSICIAN ASSISTANT

## 2023-12-02 PROCEDURE — 87811 SARS CORONAVIRUS 2 ANTIGEN POCT, MANUAL READ: ICD-10-PCS | Mod: QW,S$GLB,, | Performed by: PHYSICIAN ASSISTANT

## 2023-12-02 PROCEDURE — 87811 SARS-COV-2 COVID19 W/OPTIC: CPT | Mod: QW,S$GLB,, | Performed by: PHYSICIAN ASSISTANT

## 2023-12-02 RX ORDER — PREDNISONE 10 MG/1
TABLET ORAL
Qty: 24 TABLET | Refills: 0 | Status: SHIPPED | OUTPATIENT
Start: 2023-12-02 | End: 2023-12-10

## 2023-12-02 RX ORDER — BENZONATATE 200 MG/1
200 CAPSULE ORAL 3 TIMES DAILY PRN
Qty: 30 CAPSULE | Refills: 0 | Status: SHIPPED | OUTPATIENT
Start: 2023-12-02 | End: 2023-12-12

## 2023-12-02 NOTE — PROGRESS NOTES
"Subjective:      Patient ID: Monica Aparicio is a 33 y.o. female.    Vitals:  height is 5' 6" (1.676 m) and weight is 90.3 kg (199 lb). Her oral temperature is 97.6 °F (36.4 °C). Her blood pressure is 135/86 and her pulse is 76. Her respiration is 18.     Chief Complaint: Cough and Nausea    Cough  This is a new problem. The current episode started 1 to 4 weeks ago. The problem has been gradually worsening. The problem occurs every few hours. The cough is Productive of sputum. Associated symptoms include ear pain, headaches and nasal congestion. Pertinent negatives include no chest pain, chills, fever, myalgias, postnasal drip, rash, sore throat, shortness of breath or wheezing. Nothing aggravates the symptoms. Risk factors for lung disease include travel. She has tried OTC cough suppressant for the symptoms. The treatment provided mild relief.   Nausea  This is a new problem. The current episode started in the past 7 days. The problem occurs intermittently. The problem has been unchanged. Associated symptoms include coughing, headaches and nausea. Pertinent negatives include no abdominal pain, chest pain, chills, congestion, diaphoresis, fatigue, fever, myalgias, neck pain, rash, sore throat or vomiting. Nothing aggravates the symptoms. She has tried nothing for the symptoms. The treatment provided no relief.       Constitution: Negative for chills, sweating, fatigue and fever.   HENT:  Positive for ear pain. Negative for ear discharge, hearing loss, dental problem, drooling, mouth sores, tongue pain, tongue lesion, congestion, postnasal drip, sinus pain, sinus pressure, sore throat, trouble swallowing and voice change.    Neck: Negative for neck pain, neck stiffness and painful lymph nodes.   Cardiovascular:  Negative for chest pain and sob on exertion.   Eyes:  Negative for eye discharge and eye itching.   Respiratory:  Positive for cough. Negative for chest tightness, sputum production, shortness of breath and " wheezing.    Gastrointestinal:  Positive for nausea. Negative for abdominal pain, vomiting, constipation and diarrhea.   Musculoskeletal:  Negative for muscle cramps and muscle ache.   Skin:  Negative for rash.   Neurological:  Positive for headaches. Negative for dizziness and altered mental status.   Hematologic/Lymphatic: Negative for swollen lymph nodes.   Psychiatric/Behavioral:  Negative for altered mental status.       Past Medical History:   Diagnosis Date    Anemia     Asthma     Diverticulitis     Eczema     Family history of DVT     Family history of pulmonary embolism     Brother  form PE    Hypercalcemia     Internal hemorrhoid     Spinal stenosis     Vaginismus        Past Surgical History:   Procedure Laterality Date    PLEURODESIS USING TALC      UTERINE FIBROID SURGERY  2021       Family History   Problem Relation Age of Onset    Eczema Mother     Asthma Mother     Allergies Mother     Hypertension Mother     COPD Mother     Liver cancer Mother     Deep vein thrombosis Mother     Diabetes Mellitus Father     Eczema Sister     Asthma Sister     Allergies Sister     Eczema Brother     Pulmonary embolism Brother     Deep vein thrombosis Maternal Aunt     Eczema Other     Allergies Other     Asthma Other     Eczema Other     Asthma Other     Allergies Other        Social History     Socioeconomic History    Marital status: Single   Tobacco Use    Smoking status: Never     Passive exposure: Current    Smokeless tobacco: Never   Substance and Sexual Activity    Alcohol use: Yes     Comment: rarely    Drug use: Never       Current Outpatient Medications   Medication Sig Dispense Refill    albuterol (ACCUNEB) 1.25 mg/3 mL Nebu Take 3 mLs (1.25 mg total) by nebulization every 6 (six) hours as needed. Rescue 75 mL 11    albuterol (PROAIR HFA) 90 mcg/actuation inhaler Inhale 2 puffs into the lungs every 6 (six) hours as needed for Wheezing. Rescue 18 g 11    albuterol sulfate 2.5 mg/0.5 mL Nebu  Take 2.5 mg by nebulization every 4 (four) hours as needed (shortness of breath, chest tightness or wheezing). Rescue 10 each 0    budesonide-formoterol 160-4.5 mcg (SYMBICORT) 160-4.5 mcg/actuation HFAA Inhale 2 puffs into the lungs every 12 (twelve) hours. Controller 10.2 g 12    cetirizine (ZYRTEC) 10 MG tablet Take 1 tablet (10 mg total) by mouth once daily. 30 tablet 0    cyclobenzaprine (FLEXERIL) 5 MG tablet Cyclobenzaprine HCl 5 MG Oral Tablet QTY: 30 tablet Days: 14 Refills: 0  Written: 06/07/22 Patient Instructions: as directed  - May take every 8 hours prn muscle spasms      ergocalciferol (VITAMIN D2) 50,000 unit Cap Take 1 capsule (50,000 Units total) by mouth every 7 days. 12 capsule 3    FLOVENT  mcg/actuation inhaler Inhale 2 puffs into the lungs 2 (two) times a day. 12 g 11    lactic acid-citric-potassium (PHEXXI) 1.8-1-0.4 % Gel Place 1 applicator vaginally as needed (Right before or up to one hour before sex). 120 g 3    meloxicam (MOBIC) 7.5 MG tablet Take 7.5 mg by mouth 2 (two) times daily. as directed.      montelukast (SINGULAIR) 10 mg tablet Take 1 tablet (10 mg total) by mouth every evening. 30 tablet 11    tiZANidine (ZANAFLEX) 4 MG tablet Take 4 mg by mouth daily as needed.      benzonatate (TESSALON) 200 MG capsule Take 1 capsule (200 mg total) by mouth 3 (three) times daily as needed for Cough. 30 capsule 0    predniSONE (DELTASONE) 10 MG tablet Take 4 tablets (40 mg total) by mouth once daily for 3 days, THEN 3 tablets (30 mg total) once daily for 2 days, THEN 2 tablets (20 mg total) once daily for 2 days, THEN 1 tablet (10 mg total) once daily for 2 days. 24 tablet 0     No current facility-administered medications for this visit.       Review of patient's allergies indicates:   Allergen Reactions    Mold Shortness Of Breath    Tramadol Hives, Nausea And Vomiting, Rash and Shortness Of Breath    Ibuprofen Hives and Rash    Montelukast Hives, Nausea And Vomiting and Palpitations     Pork/porcine containing products Diarrhea, Itching and Rash       Objective:     Physical Exam   Constitutional: She is oriented to person, place, and time. She appears well-developed. She is cooperative.  Non-toxic appearance. She does not appear ill. No distress.   HENT:   Head: Normocephalic and atraumatic.   Ears:   Right Ear: Hearing, external ear and ear canal normal. Tympanic membrane is not injected, not erythematous, not retracted and not bulging. A middle ear effusion is present. impacted cerumen  Left Ear: Hearing, external ear and ear canal normal. Tympanic membrane is not injected, not erythematous, not retracted and not bulging. A middle ear effusion is present. impacted cerumen  Nose: Nose normal. No mucosal edema, rhinorrhea, nasal deformity or congestion. No epistaxis. Right sinus exhibits no maxillary sinus tenderness and no frontal sinus tenderness. Left sinus exhibits no maxillary sinus tenderness and no frontal sinus tenderness.   Mouth/Throat: Uvula is midline, oropharynx is clear and moist and mucous membranes are normal. No trismus in the jaw. Normal dentition. No uvula swelling. No oropharyngeal exudate, posterior oropharyngeal edema or posterior oropharyngeal erythema.   Eyes: Conjunctivae and lids are normal. Right eye exhibits no discharge. Left eye exhibits no discharge. No scleral icterus.   Neck: Trachea normal and phonation normal. Neck supple. No edema present. No erythema present. No neck rigidity present.   Cardiovascular: Normal rate, regular rhythm, normal heart sounds and normal pulses.   No murmur heard.Exam reveals no gallop and no friction rub.   Pulmonary/Chest: Effort normal and breath sounds normal. No stridor. No respiratory distress. She has no decreased breath sounds. She has no wheezes. She has no rhonchi. She has no rales.   Abdominal: Normal appearance.   Musculoskeletal:      Cervical back: She exhibits no tenderness.   Lymphadenopathy:     She has no cervical  adenopathy.   Neurological: She is alert and oriented to person, place, and time. She exhibits normal muscle tone. Coordination normal.   Skin: Skin is warm, dry, intact, not diaphoretic, not pale and no rash.   Psychiatric: Her speech is normal and behavior is normal. Mood, judgment and thought content normal.   Nursing note and vitals reviewed.    Results for orders placed or performed in visit on 12/02/23   SARS Coronavirus 2 Antigen, POCT Manual Read   Result Value Ref Range    SARS Coronavirus 2 Antigen Negative Negative     Acceptable Yes          Assessment:     1. Acute bronchitis, unspecified organism    2. Acute effusion of both middle ears        Plan:       Acute bronchitis, unspecified organism  -     SARS Coronavirus 2 Antigen, POCT Manual Read  -     predniSONE (DELTASONE) 10 MG tablet; Take 4 tablets (40 mg total) by mouth once daily for 3 days, THEN 3 tablets (30 mg total) once daily for 2 days, THEN 2 tablets (20 mg total) once daily for 2 days, THEN 1 tablet (10 mg total) once daily for 2 days.  Dispense: 24 tablet; Refill: 0  -     benzonatate (TESSALON) 200 MG capsule; Take 1 capsule (200 mg total) by mouth 3 (three) times daily as needed for Cough.  Dispense: 30 capsule; Refill: 0    Acute effusion of both middle ears    Results reviewed  I have reviewed the patient chart and pertinent past imaging/labs.  Patient Instructions   This is a viral infection antibiotics will not be helpful against it. Use steroids  with food and cough suppressants as we discussed. Steroids can elevate your blood pressure, elevate your blood sugar, water weight gain, nervous energy, redness to the face. If you are coughing at night and it is waking you up use one cough pill prior to bed. Some patient can feel sleepy with tessalon so please try one tonight and if not sedating you will know if you want to take it during the day.   Bronchitis can last for several weeks to several months. If your symptoms  should worsen please return to the urgent care or go the emergency department for further evaluation. Use cough drops and warm salt gargles as needed for sore throat. Tylenol/ibuprofen as needed for pain/fevers. Drink plenty of fluids and get plenty of rest. F/u as needed

## 2024-04-03 ENCOUNTER — PATIENT MESSAGE (OUTPATIENT)
Dept: ADMINISTRATIVE | Facility: HOSPITAL | Age: 34
End: 2024-04-03
Payer: COMMERCIAL

## 2024-07-09 ENCOUNTER — PATIENT MESSAGE (OUTPATIENT)
Dept: ADMINISTRATIVE | Facility: HOSPITAL | Age: 34
End: 2024-07-09
Payer: COMMERCIAL

## 2024-07-25 DIAGNOSIS — I10 PRIMARY HYPERTENSION: ICD-10-CM

## 2024-08-21 ENCOUNTER — OFFICE VISIT (OUTPATIENT)
Dept: URGENT CARE | Facility: CLINIC | Age: 34
End: 2024-08-21
Payer: COMMERCIAL

## 2024-08-21 VITALS
OXYGEN SATURATION: 97 % | BODY MASS INDEX: 31.99 KG/M2 | HEIGHT: 66 IN | RESPIRATION RATE: 20 BRPM | DIASTOLIC BLOOD PRESSURE: 85 MMHG | SYSTOLIC BLOOD PRESSURE: 129 MMHG | TEMPERATURE: 99 F | HEART RATE: 68 BPM | WEIGHT: 199.06 LBS

## 2024-08-21 DIAGNOSIS — J02.9 SORE THROAT: ICD-10-CM

## 2024-08-21 DIAGNOSIS — N89.8 VAGINAL DISCHARGE: Primary | ICD-10-CM

## 2024-08-21 DIAGNOSIS — N89.8 VAGINAL ITCHING: ICD-10-CM

## 2024-08-21 DIAGNOSIS — R09.81 NASAL CONGESTION: ICD-10-CM

## 2024-08-21 DIAGNOSIS — N76.0 ACUTE VAGINITIS: ICD-10-CM

## 2024-08-21 LAB
B-HCG UR QL: NEGATIVE
BILIRUBIN, UA POC OHS: NEGATIVE
BLOOD, UA POC OHS: NEGATIVE
CLARITY, UA POC OHS: CLEAR
COLOR, UA POC OHS: ABNORMAL
CTP QC/QA: YES
GLUCOSE, UA POC OHS: NEGATIVE
KETONES, UA POC OHS: NEGATIVE
LEUKOCYTES, UA POC OHS: NEGATIVE
MOLECULAR STREP A: NEGATIVE
NITRITE, UA POC OHS: NEGATIVE
PH, UA POC OHS: 6
PROTEIN, UA POC OHS: 30
SARS-COV-2 AG RESP QL IA.RAPID: NEGATIVE
SPECIFIC GRAVITY, UA POC OHS: >=1.03
UROBILINOGEN, UA POC OHS: 0.2

## 2024-08-21 PROCEDURE — 87491 CHLMYD TRACH DNA AMP PROBE: CPT

## 2024-08-21 PROCEDURE — 87591 N.GONORRHOEAE DNA AMP PROB: CPT

## 2024-08-21 RX ORDER — FLUTICASONE PROPIONATE 50 MCG
1 SPRAY, SUSPENSION (ML) NASAL DAILY
Qty: 11.1 ML | Refills: 0 | Status: SHIPPED | OUTPATIENT
Start: 2024-08-21

## 2024-08-21 RX ORDER — FLUCONAZOLE 150 MG/1
150 TABLET ORAL DAILY
Qty: 2 TABLET | Refills: 0 | Status: SHIPPED | OUTPATIENT
Start: 2024-08-21 | End: 2024-08-23

## 2024-08-21 NOTE — PROGRESS NOTES
"Subjective:      Patient ID: Monica Aparicio is a 34 y.o. female.    Vitals:  height is 5' 6" (1.676 m) and weight is 90.3 kg (199 lb 1.2 oz). Her oral temperature is 99 °F (37.2 °C). Her blood pressure is 129/85 and her pulse is 68. Her respiration is 20 and oxygen saturation is 97%.     Chief Complaint: Sore Throat and Vaginal Discharge    34-year-old female patient reports of having clear vaginal discharge, vaginal itching after having sexual intercourse.  Symptoms started 3 days ago.  Patient reports history of vaginal yeast infections.  Patient also reports of sore throat, congestion.  Patient would like COVID test due to possible COVID exposure from work.  She denies fever, chest pain, shortness of breath, GI complaints or any other associated symptoms.  Patient also requesting additional STD swabs.  Patient states condom broke during sexual intercourse.  Declines any blood test.    Sore Throat   This is a new problem. The current episode started in the past 7 days. The problem has been gradually worsening. There has been no fever. The pain is at a severity of 3/10. Associated symptoms include congestion. Pertinent negatives include no abdominal pain, coughing, ear discharge, ear pain, neck pain, shortness of breath, stridor, swollen glands, trouble swallowing or vomiting. She has had no exposure to strep or mono. She has tried nothing for the symptoms. The treatment provided no relief.   Vaginal Discharge  The patient's primary symptoms include vaginal discharge. The patient's pertinent negatives include no pelvic pain. This is a new problem. The current episode started in the past 7 days. The problem occurs constantly. The problem has been gradually worsening. The patient is experiencing no pain. She is not pregnant. Associated symptoms include discolored urine and a sore throat. Pertinent negatives include no abdominal pain, chills, dysuria, flank pain, frequency, nausea, rash, urgency or vomiting. The " vaginal discharge was thick and milky. There has been no bleeding. She has not been passing clots. She has not been passing tissue. The symptoms are aggravated by intercourse. She has tried nothing for the symptoms. The treatment provided no relief. She is sexually active. No, her partner does not have an STD. She uses nothing for contraception. There is no history of an abdominal surgery.       Constitution: Negative for activity change, appetite change, chills, sweating and fatigue.   HENT:  Positive for congestion and sore throat. Negative for ear pain, ear discharge, foreign body in ear, tinnitus, hearing loss, dental problem, facial trauma, nosebleeds, foreign body in nose, postnasal drip, sinus pain, sinus pressure, trouble swallowing and voice change.    Neck: Negative for neck pain, neck stiffness, painful lymph nodes and neck swelling.   Cardiovascular:  Negative for chest trauma, chest pain and leg swelling.   Eyes:  Negative for eye trauma, foreign body in eye, eye discharge and eye itching.   Respiratory:  Negative for sleep apnea, chest tightness, cough, sputum production, bloody sputum, COPD, shortness of breath, stridor, wheezing and asthma.    Gastrointestinal:  Negative for abdominal trauma, abdominal pain, abdominal bloating, history of abdominal surgery, nausea and vomiting.   Endocrine: hair loss, cold intolerance, heat intolerance and excessive thirst.   Genitourinary:  Positive for vaginal discharge. Negative for dysuria, frequency, urgency, urine decreased, flank pain, bladder incontinence, genital trauma, vaginal pain, vaginal bleeding, vaginal odor, painful intercourse, genital sore and pelvic pain.   Musculoskeletal:  Negative for pain, trauma, joint pain, joint swelling and abnormal ROM of joint.   Skin:  Negative for color change, pale, rash, wound, abrasion and laceration.   Allergic/Immunologic: Negative for environmental allergies, seasonal allergies, food allergies, eczema and asthma.    Neurological:  Negative for dizziness, history of vertigo, light-headedness, passing out, facial drooping, disorientation and altered mental status.   Hematologic/Lymphatic: Negative for swollen lymph nodes, easy bruising/bleeding, trouble clotting and history of blood clots. Does not bruise/bleed easily.   Psychiatric/Behavioral:  Negative for altered mental status, disorientation, confusion, agitation and nervous/anxious. The patient is not nervous/anxious.       Objective:     Physical Exam   Constitutional: She is oriented to person, place, and time. She appears well-developed. She is cooperative.  Non-toxic appearance. She does not appear ill. No distress.   HENT:   Head: Normocephalic and atraumatic.   Ears:   Right Ear: Hearing, tympanic membrane, external ear and ear canal normal.   Left Ear: Hearing, tympanic membrane, external ear and ear canal normal.   Nose: Congestion present. No mucosal edema, rhinorrhea or nasal deformity. No epistaxis. Right sinus exhibits no maxillary sinus tenderness and no frontal sinus tenderness. Left sinus exhibits no maxillary sinus tenderness and no frontal sinus tenderness.   Mouth/Throat: Uvula is midline, oropharynx is clear and moist and mucous membranes are normal. Mucous membranes are moist. No trismus in the jaw. Normal dentition. No uvula swelling. No oropharyngeal exudate, posterior oropharyngeal edema or posterior oropharyngeal erythema.   Eyes: Conjunctivae and lids are normal. No scleral icterus.   Neck: Trachea normal and phonation normal. Neck supple. No edema present. No erythema present. No neck rigidity present.   Cardiovascular: Normal rate, regular rhythm, normal heart sounds and normal pulses.   Pulmonary/Chest: Effort normal and breath sounds normal. No stridor. No respiratory distress. She has no decreased breath sounds. She has no wheezes. She has no rhonchi. She has no rales. She exhibits no tenderness.   Abdominal: Normal appearance and bowel sounds  are normal. She exhibits no distension and no mass. Soft. There is no abdominal tenderness. There is no rebound, no guarding, no left CVA tenderness and no right CVA tenderness. No hernia.   Musculoskeletal: Normal range of motion.         General: No deformity. Normal range of motion.   Neurological: She is alert and oriented to person, place, and time. She has normal strength. She exhibits normal muscle tone. Coordination normal.   Skin: Skin is warm, dry, intact, not diaphoretic and not pale.   Psychiatric: Her speech is normal and behavior is normal. Judgment and thought content normal.   Nursing note and vitals reviewed.    Results for orders placed or performed in visit on 08/21/24   POCT Urinalysis(Instrument)   Result Value Ref Range    Color, POC UA Nae (A) Yellow, Straw, Colorless    Clarity, POC UA Clear Clear    Glucose, POC UA Negative Negative    Bilirubin, POC UA Negative Negative    Ketones, POC UA Negative Negative    Spec Grav POC UA >=1.030 1.005 - 1.030    Blood, POC UA Negative Negative    pH, POC UA 6.0 5.0 - 8.0    Protein, POC UA 30 (A) Negative    Urobilinogen, POC UA 0.2 <=1.0    Nitrite, POC UA Negative Negative    WBC, POC UA Negative Negative   POCT urine pregnancy   Result Value Ref Range    POC Preg Test, Ur Negative Negative     Acceptable Yes    POCT Strep A, Molecular   Result Value Ref Range    Molecular Strep A, POC Negative Negative     Acceptable Yes    SARS Coronavirus 2 Antigen, POCT Manual Read   Result Value Ref Range    SARS Coronavirus 2 Antigen Negative Negative     Acceptable Yes         Assessment:     1. Vaginal discharge    2. Sore throat    3. Vaginal itching    4. Nasal congestion        Plan:       Vaginal discharge  -     POCT Urinalysis(Instrument)  -     POCT urine pregnancy  -     Vaginosis Screen by DNA Probe  -     C. trachomatis/N. gonorrhoeae by AMP DNA  -     fluconazole (DIFLUCAN) 150 MG Tab; Take 1 tablet  (150 mg total) by mouth once daily. for 2 days  Dispense: 2 tablet; Refill: 0    Sore throat  -     POCT Strep A, Molecular  -     SARS Coronavirus 2 Antigen, POCT Manual Read    Vaginal itching  -     Vaginosis Screen by DNA Probe  -     C. trachomatis/N. gonorrhoeae by AMP DNA  -     fluconazole (DIFLUCAN) 150 MG Tab; Take 1 tablet (150 mg total) by mouth once daily. for 2 days  Dispense: 2 tablet; Refill: 0    Nasal congestion  -     fluticasone propionate (FLONASE) 50 mcg/actuation nasal spray; 1 spray (50 mcg total) by Each Nostril route once daily.  Dispense: 11.1 mL; Refill: 0             Additional MDM:     Heart Failure Score:   COPD = No

## 2024-08-21 NOTE — PATIENT INSTRUCTIONS
Take Diflucan 1 time dose.  May repeat in 72 hours if symptoms persist.  We will call you in a few days regarding your vaginal swabs.  Flonase daily for nasal congestion.    What can be done to prevent this health problem?   The only sure way to keep from getting or passing on a sexually-transmitted infection is to not have sexual contact with anyone.  Even if you do not have any signs of illness, you may spread an STD.  Having an STD can increase your chances of catching HIV, the virus that causes AIDS.  If you have any type of sexual contact, use latex condoms each time to reduce the spread of infection. Use a condom with each partner every time, from the start of sex to the end.  Avoid contact with any sex partner known to have an infection or who has signs of an infection like genital sores or warts.  Avoid multiple sex partners. A long-term monogamous relationship with a partner who has tested negative and is known to have no infection is the safest partner.  If you are pregnant, get tested and get prompt treatment for an STD. This will help avoid passing it to your baby.  Avoid using drugs or too much alcohol. Either of these can lead to risky behavior like unprotected sex.  Talk to your partner about STDs before you have sex. Talk about having safe sex and if your relationship is monogamous.  Wash your hands and genitals with soap and water after sex.  See your doctor for regular exams and check-ups for STDs.  Talk to your doctor about available vaccines for prevention of certain STDs.  - Rest.    - Drink plenty of fluids.    - Acetaminophen (tylenol) or Ibuprofen (advil,motrin) as directed as needed for fever/pain. Avoid tylenol if you have a history of liver disease. Do not take ibuprofen if you have a history of GI bleeding, kidney disease, or if you take blood thinners.     - Follow up with your PCP or specialty clinic as directed in the next 1-2 weeks if not improved or as needed.  You can call [phone] to schedule an appointment with the appropriate provider.    - Go to the ER or seek medical attention immediately if you develop new or worsening symptoms.     - You must understand that you have received an Urgent Care treatment only and that you may be released before all of your medical problems are known or treated.   - You, the patient, will arrange for follow up care as instructed.   - If your condition worsens or fails to improve we recommend that you receive another evaluation at the ER immediately or contact your PCP to discuss your concerns or return here.

## 2024-08-22 ENCOUNTER — TELEPHONE (OUTPATIENT)
Dept: URGENT CARE | Facility: CLINIC | Age: 34
End: 2024-08-22
Payer: COMMERCIAL

## 2024-08-22 DIAGNOSIS — N76.0 ACUTE VAGINITIS: Primary | ICD-10-CM

## 2024-08-22 LAB
C TRACH DNA SPEC QL NAA+PROBE: NOT DETECTED
N GONORRHOEA DNA SPEC QL NAA+PROBE: NOT DETECTED

## 2024-08-23 NOTE — TELEPHONE ENCOUNTER
Called and reviewed patient test results, gonorrhea chlamydia negative, vaginal swab discontinued, will reorder and reviewed with patient she will stop by tomorrow and recollect.  Patient verbalized understanding and agreed with plan.  Verified full name and date of birth.

## 2024-08-25 ENCOUNTER — TELEPHONE (OUTPATIENT)
Dept: URGENT CARE | Facility: CLINIC | Age: 34
End: 2024-08-25
Payer: COMMERCIAL

## 2024-08-25 ENCOUNTER — APPOINTMENT (OUTPATIENT)
Dept: LAB | Facility: HOSPITAL | Age: 34
End: 2024-08-25
Attending: NURSE PRACTITIONER
Payer: COMMERCIAL

## 2024-08-25 DIAGNOSIS — N89.8 VAGINAL DISCHARGE: Primary | ICD-10-CM

## 2024-08-25 PROCEDURE — 81514 NFCT DS BV&VAGINITIS DNA ALG: CPT

## 2024-08-31 ENCOUNTER — HOSPITAL ENCOUNTER (EMERGENCY)
Facility: HOSPITAL | Age: 34
Discharge: HOME | End: 2024-08-31
Attending: STUDENT IN AN ORGANIZED HEALTH CARE EDUCATION/TRAINING PROGRAM
Payer: COMMERCIAL

## 2024-08-31 VITALS
DIASTOLIC BLOOD PRESSURE: 65 MMHG | WEIGHT: 197 LBS | RESPIRATION RATE: 16 BRPM | TEMPERATURE: 98 F | SYSTOLIC BLOOD PRESSURE: 131 MMHG | HEIGHT: 66 IN | BODY MASS INDEX: 31.66 KG/M2 | HEART RATE: 66 BPM | OXYGEN SATURATION: 99 %

## 2024-08-31 DIAGNOSIS — Z20.2 POSSIBLE EXPOSURE TO STD: ICD-10-CM

## 2024-08-31 DIAGNOSIS — N89.8 VAGINAL DISCHARGE: Primary | ICD-10-CM

## 2024-08-31 LAB
B-HCG UR QL: NEGATIVE
BACTERIA GENITAL QL WET PREP: ABNORMAL
BILIRUB UR QL STRIP: NEGATIVE
CLARITY UR REFRACT.AUTO: CLEAR
CLUE CELLS VAG QL WET PREP: ABNORMAL
COLOR UR AUTO: YELLOW
CTP QC/QA: YES
FILAMENT FUNGI VAG WET PREP-#/AREA: ABNORMAL
GLUCOSE UR QL STRIP: NEGATIVE
HCV AB SERPL QL IA: NORMAL
HGB UR QL STRIP: NEGATIVE
HIV 1+2 AB+HIV1 P24 AG SERPL QL IA: NORMAL
KETONES UR QL STRIP: NEGATIVE
LEUKOCYTE ESTERASE UR QL STRIP: NEGATIVE
NITRITE UR QL STRIP: NEGATIVE
PH UR STRIP: 7 [PH] (ref 5–8)
PROT UR QL STRIP: NEGATIVE
SP GR UR STRIP: 1.02 (ref 1–1.03)
SPECIMEN SOURCE: ABNORMAL
T VAGINALIS GENITAL QL WET PREP: ABNORMAL
TREPONEMA PALLIDUM IGG+IGM AB [PRESENCE] IN SERUM OR PLASMA BY IMMUNOASSAY: NONREACTIVE
URN SPEC COLLECT METH UR: NORMAL
WBC #/AREA VAG WET PREP: ABNORMAL
YEAST GENITAL QL WET PREP: ABNORMAL

## 2024-08-31 PROCEDURE — 86803 HEPATITIS C AB TEST: CPT | Performed by: PHYSICIAN ASSISTANT

## 2024-08-31 PROCEDURE — 25000003 PHARM REV CODE 250: Performed by: PHYSICIAN ASSISTANT

## 2024-08-31 PROCEDURE — 63600175 PHARM REV CODE 636 W HCPCS: Performed by: PHYSICIAN ASSISTANT

## 2024-08-31 PROCEDURE — 86593 SYPHILIS TEST NON-TREP QUANT: CPT | Performed by: PHYSICIAN ASSISTANT

## 2024-08-31 PROCEDURE — 81003 URINALYSIS AUTO W/O SCOPE: CPT | Performed by: PHYSICIAN ASSISTANT

## 2024-08-31 PROCEDURE — 81025 URINE PREGNANCY TEST: CPT | Performed by: PHYSICIAN ASSISTANT

## 2024-08-31 PROCEDURE — 87389 HIV-1 AG W/HIV-1&-2 AB AG IA: CPT | Performed by: PHYSICIAN ASSISTANT

## 2024-08-31 PROCEDURE — 99284 EMERGENCY DEPT VISIT MOD MDM: CPT | Mod: 25

## 2024-08-31 PROCEDURE — 96372 THER/PROPH/DIAG INJ SC/IM: CPT | Performed by: PHYSICIAN ASSISTANT

## 2024-08-31 PROCEDURE — 87210 SMEAR WET MOUNT SALINE/INK: CPT | Performed by: PHYSICIAN ASSISTANT

## 2024-08-31 PROCEDURE — 87491 CHLMYD TRACH DNA AMP PROBE: CPT | Performed by: PHYSICIAN ASSISTANT

## 2024-08-31 RX ORDER — DOXYCYCLINE HYCLATE 100 MG
100 TABLET ORAL
Status: COMPLETED | OUTPATIENT
Start: 2024-08-31 | End: 2024-08-31

## 2024-08-31 RX ORDER — DOXYCYCLINE 100 MG/1
100 CAPSULE ORAL 2 TIMES DAILY
Qty: 14 CAPSULE | Refills: 0 | Status: SHIPPED | OUTPATIENT
Start: 2024-08-31 | End: 2024-09-05

## 2024-08-31 RX ORDER — CEFTRIAXONE 500 MG/1
500 INJECTION, POWDER, FOR SOLUTION INTRAMUSCULAR; INTRAVENOUS
Status: COMPLETED | OUTPATIENT
Start: 2024-08-31 | End: 2024-08-31

## 2024-08-31 RX ORDER — FLUCONAZOLE 150 MG/1
150 TABLET ORAL DAILY
Qty: 1 TABLET | Refills: 0 | Status: SHIPPED | OUTPATIENT
Start: 2024-08-31 | End: 2024-09-01

## 2024-08-31 RX ADMIN — DOXYCYCLINE HYCLATE 100 MG: 100 TABLET, COATED ORAL at 06:08

## 2024-08-31 RX ADMIN — CEFTRIAXONE SODIUM 500 MG: 500 INJECTION, POWDER, FOR SOLUTION INTRAMUSCULAR; INTRAVENOUS at 06:08

## 2024-08-31 NOTE — ED PROVIDER NOTES
Encounter Date: 2024       History     Chief Complaint   Patient presents with    Exposure to STD     Vaginal discharge     34 y.o. Female with a PMHx of anemia, asthma, diverticulitis presents to ED with vaginal discharge x1-2 weeks.  She describes off white discharge. She has associated vaginal irritation.  She is sexually active.  She reports having a new partner.  She is unsure if her partner is having symptoms. LMP on 08/15/2024.  She denies fever, rash, sores, abdominal/pelvic pain, vomiting.    The history is provided by the patient.     Review of patient's allergies indicates:   Allergen Reactions    Mold Shortness Of Breath    Tramadol Hives, Nausea And Vomiting, Rash and Shortness Of Breath    Ibuprofen Hives and Rash    Montelukast Hives, Nausea And Vomiting and Palpitations    Pork/porcine containing products Diarrhea, Itching and Rash     Past Medical History:   Diagnosis Date    Anemia     Asthma     Diverticulitis     Eczema     Family history of DVT     Family history of pulmonary embolism     Brother  form PE    Hypercalcemia     Internal hemorrhoid     Spinal stenosis     Vaginismus      Past Surgical History:   Procedure Laterality Date    PLEURODESIS USING TALC      UTERINE FIBROID SURGERY  2021     Family History   Problem Relation Name Age of Onset    Eczema Mother      Asthma Mother      Allergies Mother      Hypertension Mother      COPD Mother      Liver cancer Mother      Deep vein thrombosis Mother      Diabetes Mellitus Father      Eczema Sister      Asthma Sister      Allergies Sister      Eczema Brother      Pulmonary embolism Brother      Deep vein thrombosis Maternal Aunt      Eczema Other nephew     Allergies Other nephew     Asthma Other nephew     Eczema Other neice     Asthma Other neice     Allergies Other neice      Social History     Tobacco Use    Smoking status: Never     Passive exposure: Current    Smokeless tobacco: Never   Substance Use Topics     Alcohol use: Yes     Comment: rarely    Drug use: Never     Review of Systems   Constitutional:  Negative for fever.   Gastrointestinal:  Negative for abdominal pain and vomiting.   Genitourinary:  Positive for vaginal discharge and vaginal pain. Negative for pelvic pain and vaginal bleeding.   Skin:  Negative for rash.       Physical Exam     Initial Vitals [08/31/24 1559]   BP Pulse Resp Temp SpO2   139/65 79 18 98.7 °F (37.1 °C) 98 %      MAP       --         Physical Exam    Nursing note and vitals reviewed.  Constitutional: She appears well-developed and well-nourished. She is not diaphoretic. No distress.   HENT:   Head: Normocephalic and atraumatic.   Nose: Nose normal.   Eyes: Conjunctivae and EOM are normal.   Neck: Neck supple.   Cardiovascular:  Normal rate.           Pulmonary/Chest: No respiratory distress.   Abdominal: She exhibits no distension. There is no abdominal tenderness. There is no guarding.   Genitourinary: Cervix exhibits no motion tenderness. Right adnexum displays no tenderness. Left adnexum displays no tenderness.    Vaginal discharge present.      No vaginal erythema, tenderness or bleeding.   No erythema, tenderness or bleeding in the vagina.    Genitourinary Comments: Chaperone present.  Nontender during speculum exam. Moderate amount of white discharge.  No sores or lesions. No CMT     Musculoskeletal:      Cervical back: Neck supple.     Neurological: She is alert and oriented to person, place, and time. Gait normal.   Skin: No rash noted.   Psychiatric: She has a normal mood and affect. Thought content normal.         ED Course   Procedures  Labs Reviewed   VAGINAL SCREEN   URINALYSIS, REFLEX TO URINE CULTURE       Result Value    Specimen UA Urine, Clean Catch      Color, UA Yellow      Appearance, UA Clear      pH, UA 7.0      Specific Gravity, UA 1.020      Protein, UA Negative      Glucose, UA Negative      Ketones, UA Negative      Bilirubin (UA) Negative      Occult Blood UA  Negative      Nitrite, UA Negative      Leukocytes, UA Negative      Narrative:     Specimen Source->Urine   HIV 1 / 2 ANTIBODY   HEPATITIS C ANTIBODY   C. TRACHOMATIS/N. GONORRHOEAE BY AMP DNA   TREPONEMA PALLIDIUM ANTIBODIES IGG, IGM   POCT URINE PREGNANCY    POC Preg Test, Ur Negative       Acceptable Yes            Imaging Results    None          Medications   cefTRIAXone injection 500 mg (has no administration in time range)   doxycycline tablet 100 mg (has no administration in time range)     Medical Decision Making  34 y.o. Female with a PMHx of anemia, asthma, diverticulitis presents to ED with vaginal discharge x1-2 weeks. Nontoxic appearing. Hemodynamically stable. Afebrile. Exam as above. STD testing and performed pelvic at this time.      Ddx:  Vaginitis, cervicitis, PID    I have low suspicion for PID at this time as she does not exhibit CMT.  She denies pelvic pain.  She was afebrile    With history of vaginal discharge and new partner empiric treatment was offered to patient. Patient would like empiric treatment at this time.  Rocephin and 1st dose of doxycycline given. Doxycycline sent to pharmacy.  Advised to check MyChart for results. She is stable for discharge.  ED precautions given to return immediately for new, worsening, or concerning symptoms    Amount and/or Complexity of Data Reviewed  Labs: ordered. Decision-making details documented in ED Course.    Risk  Prescription drug management.               ED Course as of 08/31/24 1740   Sat Aug 31, 2024   1717 Blood, UA: Negative []   1717 NITRITE UA: Negative []   1717 Leukocyte Esterase, UA: Negative []   1717 hCG Qualitative, Urine: Negative []      ED Course User Index  [HM] Gabby Singletary PA-C                           Clinical Impression:  Final diagnoses:  [N89.8] Vaginal discharge (Primary)  [Z20.2] Possible exposure to STD          ED Disposition Condition    Discharge Stable          ED Prescriptions        Medication Sig Dispense Start Date End Date Auth. Provider    doxycycline (VIBRAMYCIN) 100 MG Cap Take 1 capsule (100 mg total) by mouth 2 (two) times daily. for 7 days 14 capsule 8/31/2024 9/7/2024 Gabby Singletary PA-C    fluconazole (DIFLUCAN) 150 MG Tab Take 1 tablet (150 mg total) by mouth once daily. for 1 day 1 tablet 8/31/2024 9/1/2024 Gabby Singletary PA-C          Follow-up Information       Follow up With Specialties Details Why Contact Info    Evie Paz MD Family Medicine Schedule an appointment as soon as possible for a visit   [address] [facility] [id number] [phone]      [facility] - Emergency Dept Emergency Medicine  If symptoms worsen [address] [facility] [address] [phone]             Gabby Singletary PA-C  08/31/24 1852

## 2024-08-31 NOTE — ED NOTES
Patient c/o vaginal discharge and irritation. Discomfort with urination. And c/o throat pain. Patient has concern for exposure to STD

## 2024-08-31 NOTE — DISCHARGE INSTRUCTIONS
You were tested today for gonorrhea, chlamydia, Trichomonas, syphilis, HIV    You may check your results via MyChart    You were empirically treated today for gonorrhea and chlamydia with ceftriaxone and doxycycline.      Take doxycycline as directed for 1 week    Abstain from sexual activity for at least 2 weeks.  I recommend retesting in 2-4 weeks if your test are positive to make sure that you have cleared the infection    Strict ED precautions given to return immediately for new, worsening, or concerning symptoms

## 2024-09-01 ENCOUNTER — NURSE TRIAGE (OUTPATIENT)
Dept: ADMINISTRATIVE | Facility: CLINIC | Age: 34
End: 2024-09-01
Payer: COMMERCIAL

## 2024-09-01 LAB
C TRACH DNA SPEC QL NAA+PROBE: NOT DETECTED
N GONORRHOEA DNA SPEC QL NAA+PROBE: NOT DETECTED

## 2024-09-01 NOTE — TELEPHONE ENCOUNTER
Pt seen in ED yesterday for vaginal discharge/possible exposure to STD. States she had a lot of tests run. Pt reporting she tested negative for gonorrhea and chlamydia. Vaginal irritation and pain with urinating is not as bad this morning after antibiotic shot in the ED last night. Pt discharged with RX for doxycycline which she states she was told to take if her tests came back positive or not. Asking if she should still take it.    Dispo- advised pt per nurse knowledge that she should take the antibiotic as prescribed and in it's entirety since that's what she was advised to do by ED provider. Explained her symptoms are likely improving given rocephin shot last night but in order to completely resolve her infection, it's important to take antibiotics as prescribed. She VU. No further assistance needed.   Reason for Disposition   [1] Follow-up call to recent contact AND [2] information only call, no triage required    Protocols used: Information Only Call - No Triage-A-

## 2024-09-03 ENCOUNTER — NURSE TRIAGE (OUTPATIENT)
Dept: ADMINISTRATIVE | Facility: CLINIC | Age: 34
End: 2024-09-03
Payer: COMMERCIAL

## 2024-09-04 NOTE — TELEPHONE ENCOUNTER
"Pt states that she was seen in ED for STI testing. Pt states that she was placed on Doxycyline 100 mg as ordered. Pt states that she believes that ABT is too strong. Reports that Chlamydia and Gonorrhea results were negative.  Now c/o vomiting 2 times today and having vaginal pain with moderate itching. Pt rates pain 4/10 with sitting and 6-7/10 when standing. Advised to be seen within 24 hours per protocol. VU. Appt scheduled per protocol. Encounter routed to provider.     Reason for Disposition   Vomiting a prescription medication   MODERATE-SEVERE itching (i.e., interferes with school, work, or sleep)    Additional Information   Negative: Shock suspected (e.g., cold/pale/clammy skin, too weak to stand, low BP, rapid pulse)   Negative: Difficult to awaken or acting confused (e.g., disoriented, slurred speech)   Negative: Sounds like a life-threatening emergency to the triager   Negative: [1] Vomiting AND [2] contains red blood or black ("coffee ground") material  (Exception: Few red streaks in vomit that only happened once.)   Negative: Severe pain in one eye   Negative: Recent head injury (within last 3 days)   Negative: Recent abdominal injury (within last 3 days)   Negative: [1] Insulin-dependent diabetes (Type I) AND [2] glucose > 400 mg/dL (22 mmol/L)   Negative: [1] Vomiting AND [2] hernia is more painful or swollen than usual   Negative: [1] SEVERE vomiting (e.g., 6 or more times/day) AND [2] present > 8 hours (Exception: Patient sounds well, is drinking liquids, does not sound dehydrated, and vomiting has lasted less than 24 hours.)   Negative: [1] MODERATE vomiting (e.g., 3 - 5 times/day) AND [2] age > 60 years   Negative: Severe headache (e.g., excruciating)  (Exception: Similar to previous migraines.)   Negative: High-risk adult (e.g., diabetes mellitus, brain tumor, V-P shunt, hernia)   Negative: [1] Drinking very little AND [2] dehydration suspected (e.g., no urine > 12 hours, very dry mouth, very " lightheaded)   Negative: Patient sounds very sick or weak to the triager   Negative: [1] Vomiting AND [2] abdomen looks much more swollen than usual   Negative: [1] Vomiting AND [2] contains bile (green color)   Negative: [1] Constant abdominal pain AND [2] present > 2 hours   Negative: [1] Fever > 103 F (39.4 C) AND [2] not able to get the fever down using Fever Care Advice   Negative: [1] Fever > 101 F (38.3 C) AND [2] age > 60 years   Negative: [1] Fever > 100 F (37.8 C) AND [2] bedridden (e.g., CVA, chronic illness, recovering from surgery)   Negative: [1] Fever > 100 F (37.8 C) AND [2] weak immune system (e.g., HIV positive, cancer chemo, splenectomy, organ transplant, chronic steroids)   Negative: Taking any of the following medications: digoxin (Lanoxin), lithium, theophylline, phenytoin (Dilantin)   Negative: [1] MILD or MODERATE vomiting AND [2] present > 48 hours (2 days)  (Exception: Mild vomiting with associated diarrhea.)   Negative: Fever present > 3 days (72 hours)   Negative: Sounds like a life-threatening emergency to the triager   Negative: Patient sounds very sick or weak to the triager   Negative: [1] SEVERE pain AND [2] not improved 2 hours after pain medicine   Negative: [1] Genital area looks infected (e.g., draining sore, spreading redness) AND [2] fever   Negative: [1] Something is hanging out of the vagina AND [2] can't easily be pushed back inside    Protocols used: Vomiting-A-AH, Vaginal Symptoms-A-AH

## 2024-09-05 ENCOUNTER — OFFICE VISIT (OUTPATIENT)
Dept: PRIMARY CARE CLINIC | Facility: CLINIC | Age: 34
End: 2024-09-05
Payer: COMMERCIAL

## 2024-09-05 VITALS
WEIGHT: 191.5 LBS | TEMPERATURE: 98 F | OXYGEN SATURATION: 97 % | HEIGHT: 66 IN | HEART RATE: 71 BPM | SYSTOLIC BLOOD PRESSURE: 138 MMHG | DIASTOLIC BLOOD PRESSURE: 83 MMHG | BODY MASS INDEX: 30.78 KG/M2

## 2024-09-05 DIAGNOSIS — E21.3 HYPERPARATHYROIDISM: ICD-10-CM

## 2024-09-05 DIAGNOSIS — N92.6 MENSTRUAL DISORDER: ICD-10-CM

## 2024-09-05 DIAGNOSIS — J45.51 SEVERE PERSISTENT ASTHMA WITH EXACERBATION: ICD-10-CM

## 2024-09-05 DIAGNOSIS — Z00.00 ROUTINE HEALTH MAINTENANCE: ICD-10-CM

## 2024-09-05 DIAGNOSIS — Z71.3 RESTRICTED DIET: ICD-10-CM

## 2024-09-05 DIAGNOSIS — Z13.1 DIABETES MELLITUS SCREENING: Primary | ICD-10-CM

## 2024-09-05 PROCEDURE — 99999 PR PBB SHADOW E&M-EST. PATIENT-LVL IV: CPT | Mod: PBBFAC,,, | Performed by: STUDENT IN AN ORGANIZED HEALTH CARE EDUCATION/TRAINING PROGRAM

## 2024-09-05 PROCEDURE — 1159F MED LIST DOCD IN RCRD: CPT | Mod: CPTII,S$GLB,, | Performed by: STUDENT IN AN ORGANIZED HEALTH CARE EDUCATION/TRAINING PROGRAM

## 2024-09-05 PROCEDURE — 3008F BODY MASS INDEX DOCD: CPT | Mod: CPTII,S$GLB,, | Performed by: STUDENT IN AN ORGANIZED HEALTH CARE EDUCATION/TRAINING PROGRAM

## 2024-09-05 PROCEDURE — 3075F SYST BP GE 130 - 139MM HG: CPT | Mod: CPTII,S$GLB,, | Performed by: STUDENT IN AN ORGANIZED HEALTH CARE EDUCATION/TRAINING PROGRAM

## 2024-09-05 PROCEDURE — 3079F DIAST BP 80-89 MM HG: CPT | Mod: CPTII,S$GLB,, | Performed by: STUDENT IN AN ORGANIZED HEALTH CARE EDUCATION/TRAINING PROGRAM

## 2024-09-05 PROCEDURE — 99214 OFFICE O/P EST MOD 30 MIN: CPT | Mod: S$GLB,,, | Performed by: STUDENT IN AN ORGANIZED HEALTH CARE EDUCATION/TRAINING PROGRAM

## 2024-09-05 RX ORDER — MELOXICAM 7.5 MG/1
7.5 TABLET ORAL DAILY
Qty: 30 TABLET | Refills: 3 | Status: SHIPPED | OUTPATIENT
Start: 2024-09-05

## 2024-09-05 RX ORDER — TIZANIDINE 4 MG/1
4 TABLET ORAL EVERY 8 HOURS PRN
Qty: 30 TABLET | Refills: 3 | Status: SHIPPED | OUTPATIENT
Start: 2024-09-05

## 2024-09-05 NOTE — PROGRESS NOTES
ff2024    Monica Aparicio  [id number]    Chief Complaint   Patient presents with    Follow-up     ED visit F/U.   Kay new Endocrine referral. Last one  and was closed.     Mole     Underarm and sides. Kay moles checked.     Medication Refill       HPI    This pt is known to me and presents for ED follow up. Chronic conditions: asthma and menstrual disorder.    Patient was seen in the ED 10 days ago for possible STD exposure. Patient had prophylactic treatment with rocephin IM and doxycyline. Her testing returned as negative. Patient states she was not able to complete doxy course due to intolerance.    Patient's menses started last night and her current symptoms are related to that.     Migraines: notes that her HA have gotten much worse. They are lasting 1-2 weeks before finally going away. Hx of allergy to ibuprofen. Other otc medications don't help with HA. Meloxicam does help with HA, but she ran out. Will use heating pad for pain. Exercise: none    She would like to refill zanaflex. Originally prescribed for back pain, but helps most with menses.    Mole in under arm that she would like examined. Hx of having abnormal one removed, but pathology was normal.         Negative 10 point ROS outside of HPI    Social History     Socioeconomic History    Marital status: Single   Tobacco Use    Smoking status: Never     Passive exposure: Current    Smokeless tobacco: Never   Substance and Sexual Activity    Alcohol use: Yes     Comment: rarely    Drug use: Never           Current Outpatient Medications:     albuterol (ACCUNEB) 1.25 mg/3 mL Nebu, Take 3 mLs (1.25 mg total) by nebulization every 6 (six) hours as needed. Rescue, Disp: 75 mL, Rfl: 11    albuterol (PROAIR HFA) 90 mcg/actuation inhaler, Inhale 2 puffs into the lungs every 6 (six) hours as needed for Wheezing. Rescue, Disp: 18 g, Rfl: 11    budesonide-formoterol 160-4.5 mcg (SYMBICORT) 160-4.5 mcg/actuation HFAA, Inhale 2 puffs into the lungs  every 12 (twelve) hours. Controller, Disp: 10.2 g, Rfl: 12    tiZANidine (ZANAFLEX) 4 MG tablet, Take 4 mg by mouth daily as needed., Disp: , Rfl:     cetirizine (ZYRTEC) 10 MG tablet, Take 1 tablet (10 mg total) by mouth once daily. (Patient not taking: Reported on 9/5/2024), Disp: 30 tablet, Rfl: 0    cyclobenzaprine (FLEXERIL) 5 MG tablet, Cyclobenzaprine HCl 5 MG Oral Tablet QTY: 30 tablet Days: 14 Refills: 0  Written: 06/07/22 Patient Instructions: as directed  - May take every 8 hours prn muscle spasms (Patient not taking: Reported on 8/21/2024), Disp: , Rfl:     doxycycline (VIBRAMYCIN) 100 MG Cap, Take 1 capsule (100 mg total) by mouth 2 (two) times daily. for 7 days (Patient not taking: Reported on 9/5/2024), Disp: 14 capsule, Rfl: 0    FLOVENT  mcg/actuation inhaler, Inhale 2 puffs into the lungs 2 (two) times a day., Disp: 12 g, Rfl: 11    fluticasone propionate (FLONASE) 50 mcg/actuation nasal spray, 1 spray (50 mcg total) by Each Nostril route once daily., Disp: 11.1 mL, Rfl: 0    lactic acid-citric-potassium (PHEXXI) 1.8-1-0.4 % Gel, Place 1 applicator vaginally as needed (Right before or up to one hour before sex). (Patient not taking: Reported on 8/21/2024), Disp: 120 g, Rfl: 3    meloxicam (MOBIC) 7.5 MG tablet, Take 7.5 mg by mouth 2 (two) times daily. as directed. (Patient not taking: Reported on 8/21/2024), Disp: , Rfl:       Physical Exam  Vitals:    09/05/24 0820   BP: 138/83   Pulse: 71   Temp: 97.7 °F (36.5 °C)       Physical Exam      Gen: well appearing, NAD  Resp: non labored breathing, no crackles, no wheezes, CTAB  CV: RRR no murmur, gallops, rubs, no LE edema  Abd: soft nontender BS present no organomegaly\      1. Diabetes mellitus screening  - Hemoglobin A1C; Future    2. Hyperparathyroidism  - PTH, intact; Future  - Vitamin D; Future  - Comprehensive Metabolic Panel; Future  - Ambulatory referral/consult to Endocrinology; Future    3. Routine health maintenance  - Lipid Panel;  Future    4. Severe persistent asthma with exacerbation  Fair control. Continue current regimen    5. Menstrual disorder  - tiZANidine (ZANAFLEX) 4 MG tablet; Take 1 tablet (4 mg total) by mouth every 8 (eight) hours as needed (menstral pain).  Dispense: 30 tablet; Refill: 3  - meloxicam (MOBIC) 7.5 MG tablet; Take 1 tablet (7.5 mg total) by mouth once daily. as directed.  Dispense: 30 tablet; Refill: 3  -advised against sleeping with heating pad due to increased risk of burn    6. Restricted diet  - Iron and TIBC; Future  - FERRITIN; Future  - VITAMIN B12; Future  - FOLATE; Future          RTC in one year or sooner as needed for routine care    Evie Paz MD  Family Medicine

## 2024-09-19 ENCOUNTER — TELEPHONE (OUTPATIENT)
Dept: PRIMARY CARE CLINIC | Facility: CLINIC | Age: 34
End: 2024-09-19

## 2024-09-19 ENCOUNTER — OFFICE VISIT (OUTPATIENT)
Dept: PRIMARY CARE CLINIC | Facility: CLINIC | Age: 34
End: 2024-09-19
Payer: COMMERCIAL

## 2024-09-19 DIAGNOSIS — F43.0 ACUTE STRESS REACTION: Primary | ICD-10-CM

## 2024-09-19 PROCEDURE — 99214 OFFICE O/P EST MOD 30 MIN: CPT | Mod: 95,,, | Performed by: STUDENT IN AN ORGANIZED HEALTH CARE EDUCATION/TRAINING PROGRAM

## 2024-09-19 NOTE — LETTER
September 19, 2024    Monica Aparicio  [address]   [address]  [place] [address]             [facility] - Primary Care  Primary Care  [address]  [facility] [address]  [facility] [address]  Phone: [phone]  Fax: [phone]   September 19, 2024     Patient: Monica Aparicio   YOB: 1990   Date of Visit: 9/19/2024       To Whom it May Concern:    I am writing on behalf of my patient, Monica Aparicio,  who is under my care for an acute medical condition. It is my professional recommendation that she takes a medical leave of absence from work for an estimated period of two weeks, starting on/about September 23, 2024.    This leave is necessary to allow Monica Aparicio the appropriate time for treatment and recovery. During this time, it is important that she focus on her health and refrain from work-related activities. She is expected to return to work on October 7, 2024, barring any unforeseen complications.    If you require any further medical information or documentation, please do not hesitate to contact my office. I appreciate your understanding and support during this period.    Sincerely,  Evie Paz MD  [facility]
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
## 2024-09-23 ENCOUNTER — PATIENT MESSAGE (OUTPATIENT)
Dept: PRIMARY CARE CLINIC | Facility: CLINIC | Age: 34
End: 2024-09-23
Payer: COMMERCIAL

## 2024-10-03 ENCOUNTER — TELEPHONE (OUTPATIENT)
Dept: ENDOCRINOLOGY | Facility: CLINIC | Age: 34
End: 2024-10-03
Payer: COMMERCIAL

## 2024-10-08 NOTE — PROGRESS NOTES
"Subjective:      Patient ID: Monica Aparicio is a 34 y.o. female.    Chief Complaint:  No chief complaint on file.    History of Present Illness  Monica Aparicio is here for evaluation and management of Hyperparathyroidism.  Referred by Dr Paz. This is their first visit with me.    This is a MyChart video visit.    The patient location is: [place]  The chief complaint leading to consultation is: Parathyroid  Visit type: Virtual visit with synchronous audio and video  Total time spent with patient: see below  Each patient to whom he or she provides medical services by telemedicine is:  (1) informed of the relationship between the physician and patient and the respective role of any other health care provider with respect to management of the patient; and (2) notified that he or she may decline to receive medical services by telemedicine and may withdraw from such care at any time.    With regards to Hyperparathyroidism:    Reports history of Hyperparathyroidism evaluation in [place]. Patient reports she was supposed to go to surgery but then moved to [place].      Latest Reference Range & Units 07/21/23 14:53   Calcium 8.7 - 10.5 mg/dL 10.4   Albumin 3.5 - 5.2 g/dL 3.7   Vitamin D 30 - 96 ng/mL 12 (L)   PTH 9.0 - 77.0 pg/mL 117.6 (H)   (L): Data is abnormally low  (H): Data is abnormally high    Lab Results   Component Value Date    .6 (H) 07/21/2023    CALCIUM 10.4 07/21/2023     Lab Results   Component Value Date    ALBUMIN 3.7 07/21/2023       Daily intake of calcium is: Denies taking any supplemental calcium   Daily intake of vitamin D:  None -- of note, has trouble taking pills     BMD:  "a long time" in [place]     Reports constipation - noting not as severe as in the past, muscle aches (upper   Denies polyuria, fractures, height loss or kidney stones.  Denies history of renal disease.  Denies family history of hyperparathyroidism or calcium problems.  FH of Kidney Stones: mother   Denies HCTZ., lithium, or " "chlorthiadone use.    Imaging outside of [facility]:  -NM Parathyroid Scan  2021  Grossly unremarkable study including no scintigraphic or CT evidence to suggest parathyroid adenoma.   -Thyroid US  2021  1. No parathyroid adenoma identified.   2. No thyroid nodule.     Review of Systems  As above      There were no vitals taken for this visit.    There is no height or weight on file to calculate BMI.    Lab Review:   No results found for: "HGBA1C"    Lab Results   Component Value Date    CHOL 192 07/21/2023    HDL 53 07/21/2023    LDLCALC 125.4 07/21/2023    TRIG 68 07/21/2023    CHOLHDL 27.6 07/21/2023     Lab Results   Component Value Date     07/21/2023    K 3.9 07/21/2023     07/21/2023    CO2 25 07/21/2023    GLU 75 07/21/2023    BUN 6 07/21/2023    CREATININE 0.8 07/21/2023    CALCIUM 10.4 07/21/2023    PROT 7.7 07/21/2023    ALBUMIN 3.7 07/21/2023    BILITOT 0.6 07/21/2023    ALKPHOS 60 07/21/2023    AST 18 07/21/2023    ALT 15 07/21/2023    ANIONGAP 8 07/21/2023    TSH 1.759 07/21/2023     Vit D, 25-Hydroxy   Date Value Ref Range Status   07/21/2023 12 (L) 30 - 96 ng/mL Final     Comment:     Vitamin D deficiency.........<10 ng/mL                              Vitamin D insufficiency......10-29 ng/mL       Vitamin D sufficiency........> or equal to 30 ng/mL  Vitamin D toxicity............>100 ng/mL       Assessment and Plan     1. Hyperparathyroidism  Ambulatory referral/consult to Endocrinology    Renal Function Panel      2. Vitamin D deficiency            Hyperparathyroidism  -- Labs now: RFP, PTH, Vitamin D.  -- Replete vitamin D THEN check 24 urine calcium and creatinine to exclude FHH.  -- If diagnosis confirmed - neck ultrasound, NM Parathyroid scan or CT.   -- Check bone DXA with distal radius.  -- Discussed indications for surgery if primary hyperparathyroidism proven.  -- Avoid dehydration, excessive calcium supplementation and meds (HCTZ) that can worsen hypercalcemia.  -- Follow up after " results reviewed.    Vitamin D deficiency  -- Check vitamin D - of note, she will need to take OTC gummy as she has a lot of difficulty swallowing pills.       Follow up in about 6 months (around 4/14/2025).    I spent 30 minutes face-to-face with the patient, over half of the visit was spent on counseling and/or coordinating the care of the patient.    Counseling includes:  Diagnostic results, impressions, recommendations   Prognosis   Risk and benefits of management/treatment options   Instructions for management treatment and or follow-up   Importance of compliance with management   Risk factor reduction   Patient education    Visit today included increased complexity associated with the care of the problems addressed and managing the longitudinal care of the patient due to the serious and/or complex managed problems.

## 2024-10-09 ENCOUNTER — TELEPHONE (OUTPATIENT)
Dept: PRIMARY CARE CLINIC | Facility: CLINIC | Age: 34
End: 2024-10-09
Payer: COMMERCIAL

## 2024-10-09 NOTE — TELEPHONE ENCOUNTER
----- Message from Dasia sent at 10/9/2024  3:07 PM CDT -----  Contact: [phone]  [email or web address]     Patient is calling for Medical Advice regarding:pt is calling to fu on request for doctors note for appt 10/7-10/9      Pt would like to be reached in portal   Please call pt and advise

## 2024-10-14 ENCOUNTER — OFFICE VISIT (OUTPATIENT)
Dept: ENDOCRINOLOGY | Facility: CLINIC | Age: 34
End: 2024-10-14
Payer: COMMERCIAL

## 2024-10-14 DIAGNOSIS — E55.9 VITAMIN D DEFICIENCY: ICD-10-CM

## 2024-10-14 DIAGNOSIS — E21.3 HYPERPARATHYROIDISM: Primary | ICD-10-CM

## 2024-10-14 PROCEDURE — 1159F MED LIST DOCD IN RCRD: CPT | Mod: CPTII,95,, | Performed by: NURSE PRACTITIONER

## 2024-10-14 PROCEDURE — G2211 COMPLEX E/M VISIT ADD ON: HCPCS | Mod: 95,,, | Performed by: NURSE PRACTITIONER

## 2024-10-14 PROCEDURE — 1160F RVW MEDS BY RX/DR IN RCRD: CPT | Mod: CPTII,95,, | Performed by: NURSE PRACTITIONER

## 2024-10-14 PROCEDURE — 99204 OFFICE O/P NEW MOD 45 MIN: CPT | Mod: 95,,, | Performed by: NURSE PRACTITIONER

## 2024-10-14 NOTE — ASSESSMENT & PLAN NOTE
-- Labs now: RFP, PTH, Vitamin D.  -- Replete vitamin D THEN check 24 urine calcium and creatinine to exclude FHH.  -- If diagnosis confirmed - neck ultrasound, NM Parathyroid scan or CT.   -- Check bone DXA with distal radius.  -- Discussed indications for surgery if primary hyperparathyroidism proven.  -- Avoid dehydration, excessive calcium supplementation and meds (HCTZ) that can worsen hypercalcemia.  -- Follow up after results reviewed.

## 2024-10-14 NOTE — ASSESSMENT & PLAN NOTE
-- Check vitamin D - of note, she will need to take OTC gummy as she has a lot of difficulty swallowing pills.

## 2024-10-17 ENCOUNTER — OFFICE VISIT (OUTPATIENT)
Dept: PRIMARY CARE CLINIC | Facility: CLINIC | Age: 34
End: 2024-10-17
Payer: COMMERCIAL

## 2024-10-17 ENCOUNTER — LAB VISIT (OUTPATIENT)
Dept: LAB | Facility: HOSPITAL | Age: 34
End: 2024-10-17
Attending: STUDENT IN AN ORGANIZED HEALTH CARE EDUCATION/TRAINING PROGRAM
Payer: COMMERCIAL

## 2024-10-17 DIAGNOSIS — Z00.00 ROUTINE HEALTH MAINTENANCE: ICD-10-CM

## 2024-10-17 DIAGNOSIS — E21.3 HYPERPARATHYROIDISM: ICD-10-CM

## 2024-10-17 DIAGNOSIS — Z71.3 RESTRICTED DIET: ICD-10-CM

## 2024-10-17 DIAGNOSIS — Z13.1 DIABETES MELLITUS SCREENING: ICD-10-CM

## 2024-10-17 DIAGNOSIS — J45.40 MODERATE PERSISTENT ASTHMA WITHOUT COMPLICATION: Primary | ICD-10-CM

## 2024-10-17 LAB
25(OH)D3+25(OH)D2 SERPL-MCNC: 13 NG/ML (ref 30–96)
ALBUMIN SERPL BCP-MCNC: 3.6 G/DL (ref 3.5–5.2)
ALBUMIN SERPL BCP-MCNC: 3.6 G/DL (ref 3.5–5.2)
ALP SERPL-CCNC: 61 U/L (ref 40–150)
ALT SERPL W/O P-5'-P-CCNC: 12 U/L (ref 10–44)
ANION GAP SERPL CALC-SCNC: 8 MMOL/L (ref 8–16)
ANION GAP SERPL CALC-SCNC: 8 MMOL/L (ref 8–16)
AST SERPL-CCNC: 13 U/L (ref 10–40)
BILIRUB SERPL-MCNC: 0.8 MG/DL (ref 0.1–1)
BUN SERPL-MCNC: 5 MG/DL (ref 6–20)
BUN SERPL-MCNC: 5 MG/DL (ref 6–20)
CALCIUM SERPL-MCNC: 10.8 MG/DL (ref 8.7–10.5)
CALCIUM SERPL-MCNC: 10.8 MG/DL (ref 8.7–10.5)
CHLORIDE SERPL-SCNC: 110 MMOL/L (ref 95–110)
CHLORIDE SERPL-SCNC: 110 MMOL/L (ref 95–110)
CHOLEST SERPL-MCNC: 158 MG/DL (ref 120–199)
CHOLEST/HDLC SERPL: 2.9 {RATIO} (ref 2–5)
CO2 SERPL-SCNC: 23 MMOL/L (ref 23–29)
CO2 SERPL-SCNC: 23 MMOL/L (ref 23–29)
CREAT SERPL-MCNC: 0.7 MG/DL (ref 0.5–1.4)
CREAT SERPL-MCNC: 0.7 MG/DL (ref 0.5–1.4)
EST. GFR  (NO RACE VARIABLE): >60 ML/MIN/1.73 M^2
EST. GFR  (NO RACE VARIABLE): >60 ML/MIN/1.73 M^2
ESTIMATED AVG GLUCOSE: 108 MG/DL (ref 68–131)
FERRITIN SERPL-MCNC: 15 NG/ML (ref 20–300)
FOLATE SERPL-MCNC: 7.4 NG/ML (ref 4–24)
GLUCOSE SERPL-MCNC: 97 MG/DL (ref 70–110)
GLUCOSE SERPL-MCNC: 97 MG/DL (ref 70–110)
HBA1C MFR BLD: 5.4 % (ref 4–5.6)
HDLC SERPL-MCNC: 55 MG/DL (ref 40–75)
HDLC SERPL: 34.8 % (ref 20–50)
IRON SERPL-MCNC: 44 UG/DL (ref 30–160)
LDLC SERPL CALC-MCNC: 96.2 MG/DL (ref 63–159)
NONHDLC SERPL-MCNC: 103 MG/DL
PHOSPHATE SERPL-MCNC: 2.3 MG/DL (ref 2.7–4.5)
POTASSIUM SERPL-SCNC: 4.2 MMOL/L (ref 3.5–5.1)
POTASSIUM SERPL-SCNC: 4.2 MMOL/L (ref 3.5–5.1)
PROT SERPL-MCNC: 7.3 G/DL (ref 6–8.4)
PTH-INTACT SERPL-MCNC: 82.8 PG/ML (ref 9–77)
SATURATED IRON: 11 % (ref 20–50)
SODIUM SERPL-SCNC: 141 MMOL/L (ref 136–145)
SODIUM SERPL-SCNC: 141 MMOL/L (ref 136–145)
TOTAL IRON BINDING CAPACITY: 407 UG/DL (ref 250–450)
TRANSFERRIN SERPL-MCNC: 275 MG/DL (ref 200–375)
TRIGL SERPL-MCNC: 34 MG/DL (ref 30–150)
VIT B12 SERPL-MCNC: 754 PG/ML (ref 210–950)

## 2024-10-17 PROCEDURE — 82728 ASSAY OF FERRITIN: CPT | Performed by: STUDENT IN AN ORGANIZED HEALTH CARE EDUCATION/TRAINING PROGRAM

## 2024-10-17 PROCEDURE — 83970 ASSAY OF PARATHORMONE: CPT | Performed by: STUDENT IN AN ORGANIZED HEALTH CARE EDUCATION/TRAINING PROGRAM

## 2024-10-17 PROCEDURE — 82746 ASSAY OF FOLIC ACID SERUM: CPT | Performed by: STUDENT IN AN ORGANIZED HEALTH CARE EDUCATION/TRAINING PROGRAM

## 2024-10-17 PROCEDURE — 83036 HEMOGLOBIN GLYCOSYLATED A1C: CPT | Performed by: STUDENT IN AN ORGANIZED HEALTH CARE EDUCATION/TRAINING PROGRAM

## 2024-10-17 PROCEDURE — 84466 ASSAY OF TRANSFERRIN: CPT | Performed by: STUDENT IN AN ORGANIZED HEALTH CARE EDUCATION/TRAINING PROGRAM

## 2024-10-17 PROCEDURE — 99213 OFFICE O/P EST LOW 20 MIN: CPT | Mod: 95,,, | Performed by: STUDENT IN AN ORGANIZED HEALTH CARE EDUCATION/TRAINING PROGRAM

## 2024-10-17 PROCEDURE — 36415 COLL VENOUS BLD VENIPUNCTURE: CPT | Performed by: STUDENT IN AN ORGANIZED HEALTH CARE EDUCATION/TRAINING PROGRAM

## 2024-10-17 PROCEDURE — 82607 VITAMIN B-12: CPT | Performed by: STUDENT IN AN ORGANIZED HEALTH CARE EDUCATION/TRAINING PROGRAM

## 2024-10-17 PROCEDURE — 80069 RENAL FUNCTION PANEL: CPT | Performed by: NURSE PRACTITIONER

## 2024-10-17 PROCEDURE — 82306 VITAMIN D 25 HYDROXY: CPT | Performed by: STUDENT IN AN ORGANIZED HEALTH CARE EDUCATION/TRAINING PROGRAM

## 2024-10-17 PROCEDURE — 80053 COMPREHEN METABOLIC PANEL: CPT | Performed by: STUDENT IN AN ORGANIZED HEALTH CARE EDUCATION/TRAINING PROGRAM

## 2024-10-17 PROCEDURE — 80061 LIPID PANEL: CPT | Performed by: STUDENT IN AN ORGANIZED HEALTH CARE EDUCATION/TRAINING PROGRAM

## 2024-10-17 PROCEDURE — 84100 ASSAY OF PHOSPHORUS: CPT | Performed by: NURSE PRACTITIONER

## 2024-10-17 RX ORDER — BUDESONIDE AND FORMOTEROL FUMARATE DIHYDRATE 160; 4.5 UG/1; UG/1
2 AEROSOL RESPIRATORY (INHALATION) EVERY 12 HOURS
Qty: 10.2 G | Refills: 12 | Status: SHIPPED | OUTPATIENT
Start: 2024-10-17 | End: 2025-10-17

## 2024-10-17 NOTE — PROGRESS NOTES
10/17/2024    Monica Aparicio  [id number]    CC: follow up    Location:[place]  Visit type: audiovisual     Face to Face time with patient: 25 minutes  total minutes of total time spent on the encounter, which includes face to face time and non-face to face time preparing to see the patient (eg, review of tests), Obtaining and/or reviewing separately obtained history, Documenting clinical information in the electronic or other health record, Independently interpreting results (not separately reported) and communicating results to the patient/family/caregiver, or Care coordination (not separately reported).     Each patient to whom he or she provides medical services by telemedicine is:  (1) informed of the relationship between the physician and patient and the respective role of any other health care provider with respect to management of the patient; and (2) notified that he or she may decline to receive medical services by telemedicine and may withdraw from such care at any time.      HPI  This pt is known to me and presents virtually for follow up. Chronic conditions: hyperparathyroidism and asthma.    Has been feeling weird. [place] family in [place] affected. Now more of her family can relate. Tried melatonin but didn't help [facility] to [facility] with sleepy time tea, which has been working. Sleeping a little better    Since last visit has followed with endocrinology    Needs refill of symbicort    right 5th toe with pain: pain with standing, but not touching. Feels tingling. Is aware of her toe. Present for the last week      Negative 10 point ROS outside of HPI    Social History     Socioeconomic History    Marital status: Single   Tobacco Use    Smoking status: Never     Passive exposure: Current    Smokeless tobacco: Never   Substance and Sexual Activity    Alcohol use: Yes     Comment: rarely    Drug use: Never     Social Drivers of Health     Financial Resource Strain: Medium Risk (10/14/2024)    Overall  Financial Resource Strain (CARDIA)     Difficulty of Paying Living Expenses: Somewhat hard   Food Insecurity: Food Insecurity Present (10/14/2024)    Hunger Vital Sign     Worried About Running Out of Food in the Last Year: Sometimes true     Ran Out of Food in the Last Year: Sometimes true   Physical Activity: Insufficiently Active (10/14/2024)    Exercise Vital Sign     Days of Exercise per Week: 4 days     Minutes of Exercise per Session: 30 min   Stress: Stress Concern Present (10/14/2024)    [employer] - Occupational Stress Questionnaire     Feeling of Stress : Very much   Housing Stability: High Risk (10/14/2024)    Housing Stability Vital Sign     Unable to Pay for Housing in the Last Year: Yes           Current Outpatient Medications:     albuterol (ACCUNEB) 1.25 mg/3 mL Nebu, Take 3 mLs (1.25 mg total) by nebulization every 6 (six) hours as needed. Rescue, Disp: 75 mL, Rfl: 11    albuterol (PROAIR HFA) 90 mcg/actuation inhaler, Inhale 2 puffs into the lungs every 6 (six) hours as needed for Wheezing. Rescue, Disp: 18 g, Rfl: 11    budesonide-formoterol 160-4.5 mcg (SYMBICORT) 160-4.5 mcg/actuation HFAA, Inhale 2 puffs into the lungs every 12 (twelve) hours. Controller, Disp: 10.2 g, Rfl: 12    cetirizine (ZYRTEC) 10 MG tablet, Take 1 tablet (10 mg total) by mouth once daily. (Patient not taking: Reported on 9/5/2024), Disp: 30 tablet, Rfl: 0    lactic acid-citric-potassium (PHEXXI) 1.8-1-0.4 % Gel, Place 1 applicator vaginally as needed (Right before or up to one hour before sex). (Patient not taking: Reported on 8/21/2024), Disp: 120 g, Rfl: 3    meloxicam (MOBIC) 7.5 MG tablet, Take 1 tablet (7.5 mg total) by mouth once daily. as directed., Disp: 30 tablet, Rfl: 3    tiZANidine (ZANAFLEX) 4 MG tablet, Take 1 tablet (4 mg total) by mouth every 8 (eight) hours as needed (menstral pain)., Disp: 30 tablet, Rfl: 3      Physical Exam  There were no vitals filed for this  visit.    Vitals unable to obtain    Gen: well appearing  Resp: speaks in full sentences. Non labored breathing  Cv: non-diaphoretic    1. Moderate persistent asthma without complication  - REFILLED budesonide-formoterol 160-4.5 mcg (SYMBICORT) 160-4.5 mcg/actuation HFAA; Inhale 2 puffs into the lungs every 12 (twelve) hours. Controller  Dispense: 10.2 g; Refill: 12          RTC as needed for routine care    Evie Paz MD  Family Medicine

## 2024-10-18 ENCOUNTER — PATIENT MESSAGE (OUTPATIENT)
Dept: ENDOCRINOLOGY | Facility: CLINIC | Age: 34
End: 2024-10-18
Payer: COMMERCIAL

## 2024-10-18 DIAGNOSIS — E21.3 HYPERPARATHYROIDISM: Primary | ICD-10-CM

## 2024-10-18 NOTE — TELEPHONE ENCOUNTER
Component      Latest Ref Rng 10/17/2024   Glucose      70 - 110 mg/dL 97    Sodium      136 - 145 mmol/L 141    Potassium      3.5 - 5.1 mmol/L 4.2    Chloride      95 - 110 mmol/L 110    CO2      23 - 29 mmol/L 23    BUN      6 - 20 mg/dL 5 (L)    Calcium      8.7 - 10.5 mg/dL 10.8 (H)    Creatinine      0.5 - 1.4 mg/dL 0.7    Albumin      3.5 - 5.2 g/dL 3.6    Phosphorus Level      2.7 - 4.5 mg/dL 2.3 (L)    eGFR      >60 mL/min/1.73 m^2 >60.0    Anion Gap      8 - 16 mmol/L 8    PTH      9.0 - 77.0 pg/mL 82.8 (H)    Vitamin D      30 - 96 ng/mL 13 (L)       Legend:  (L) Low  (H) High

## 2024-11-04 ENCOUNTER — OFFICE VISIT (OUTPATIENT)
Dept: URGENT CARE | Facility: CLINIC | Age: 34
End: 2024-11-04
Payer: COMMERCIAL

## 2024-11-04 VITALS
TEMPERATURE: 98 F | SYSTOLIC BLOOD PRESSURE: 139 MMHG | RESPIRATION RATE: 16 BRPM | WEIGHT: 191 LBS | OXYGEN SATURATION: 97 % | HEART RATE: 70 BPM | DIASTOLIC BLOOD PRESSURE: 85 MMHG | BODY MASS INDEX: 30.7 KG/M2 | HEIGHT: 66 IN

## 2024-11-04 DIAGNOSIS — J45.909 ASTHMA, UNSPECIFIED ASTHMA SEVERITY, UNSPECIFIED WHETHER COMPLICATED, UNSPECIFIED WHETHER PERSISTENT: ICD-10-CM

## 2024-11-04 DIAGNOSIS — J06.9 VIRAL URI WITH COUGH: Primary | ICD-10-CM

## 2024-11-04 PROCEDURE — 99213 OFFICE O/P EST LOW 20 MIN: CPT | Mod: S$GLB,,, | Performed by: NURSE PRACTITIONER

## 2024-11-04 RX ORDER — IPRATROPIUM BROMIDE 21 UG/1
2 SPRAY, METERED NASAL 2 TIMES DAILY
Qty: 30 ML | Refills: 0 | Status: SHIPPED | OUTPATIENT
Start: 2024-11-04 | End: 2024-11-11

## 2024-11-04 RX ORDER — PROMETHAZINE HYDROCHLORIDE AND DEXTROMETHORPHAN HYDROBROMIDE 6.25; 15 MG/5ML; MG/5ML
5 SYRUP ORAL EVERY 8 HOURS PRN
Qty: 118 ML | Refills: 0 | Status: SHIPPED | OUTPATIENT
Start: 2024-11-04 | End: 2024-11-14

## 2024-11-04 NOTE — PROGRESS NOTES
"Subjective:      Patient ID: Monica Aparicio is a 34 y.o. female.    Vitals:  height is 5' 6" (1.676 m) and weight is 86.6 kg (191 lb). Her oral temperature is 98.4 °F (36.9 °C). Her blood pressure is 139/85 and her pulse is 70. Her respiration is 16 and oxygen saturation is 97%.     Chief Complaint: Sore Throat    This is a 34 y.o. female who presents today with a chief complaint of sore throat cough chest congestion headaches that started 2 weeks ago. Symptoms got worse.     Provider note begins below:    Patient comes to the clinic with complaint of sinus congestion, sore throat, headache and cough x2 weeks.  No fever.  No known sick contacts.  Recently started new job but denies history of allergy exposure to any allergens or particular it is.      Sore Throat   This is a new problem. The current episode started 1 to 4 weeks ago. The problem has been unchanged. Neither side of throat is experiencing more pain than the other. There has been no fever. The pain is at a severity of 7/10. The pain is mild. Associated symptoms include congestion, coughing, headaches, neck pain and trouble swallowing.     HENT:  Positive for congestion, sore throat and trouble swallowing.    Neck: Positive for neck pain.   Respiratory:  Positive for cough.    Neurological:  Positive for headaches.      Objective:     Physical Exam   Constitutional: She is oriented to person, place, and time. She appears well-developed. She is cooperative.  Non-toxic appearance. She does not appear ill. No distress.   HENT:   Head: Normocephalic and atraumatic.   Ears:   Right Ear: Hearing, tympanic membrane, external ear and ear canal normal.   Left Ear: Hearing, tympanic membrane, external ear and ear canal normal.   Nose: Rhinorrhea present. No mucosal edema or nasal deformity. No epistaxis. Right sinus exhibits no maxillary sinus tenderness and no frontal sinus tenderness. Left sinus exhibits no maxillary sinus tenderness and no frontal sinus " tenderness.   Mouth/Throat: Uvula is midline, oropharynx is clear and moist and mucous membranes are normal. No trismus in the jaw. Normal dentition. No uvula swelling. Cobblestoning present. No oropharyngeal exudate, posterior oropharyngeal edema or posterior oropharyngeal erythema.   Eyes: Conjunctivae and lids are normal. No scleral icterus.   Neck: Trachea normal and phonation normal. Neck supple. No edema present. No erythema present. No neck rigidity present.   Cardiovascular: Normal rate, regular rhythm, normal heart sounds and normal pulses.   Pulmonary/Chest: Effort normal and breath sounds normal. No stridor. No respiratory distress. She has no decreased breath sounds. She has no wheezes. She has no rhonchi. She has no rales.           Abdominal: Normal appearance.   Musculoskeletal: Normal range of motion.         General: No deformity. Normal range of motion.   Neurological: She is alert and oriented to person, place, and time. She exhibits normal muscle tone. Coordination normal.   Skin: Skin is warm, dry, intact, not diaphoretic and not pale.   Psychiatric: Her speech is normal and behavior is normal. Judgment and thought content normal.   Nursing note and vitals reviewed.      Assessment:     1. Viral URI with cough    2. Asthma, unspecified asthma severity, unspecified whether complicated, unspecified whether persistent        Plan:       Viral URI with cough  -     ipratropium (ATROVENT) 21 mcg (0.03 %) nasal spray; 2 sprays by Each Nostril route 2 (two) times daily. for 7 days  Dispense: 30 mL; Refill: 0  -     promethazine-dextromethorphan (PROMETHAZINE-DM) 6.25-15 mg/5 mL Syrp; Take 5 mLs by mouth every 8 (eight) hours as needed (Cough).  Dispense: 118 mL; Refill: 0    Asthma, unspecified asthma severity, unspecified whether complicated, unspecified whether persistent      Patient Instructions   Please use your nebulizer when you arrive home to help with your cough and asthma symptoms.

## 2024-11-04 NOTE — LETTER
November 4, 2024      [facility] [place]  [address] [place] [address]  Phone: [phone]  Fax: [phone]       Patient: Monica Aparicio   YOB: 1990  Date of Visit: 11/04/2024    To Whom It May Concern:    Troy Aparicio  was at [facility] on 11/04/2024. The patient may return to work/school on 11/6/2024 with no restrictions. If you have any questions or concerns, or if I can be of further assistance, please do not hesitate to contact me.    Sincerely,      Darek Gonzalez NP

## 2025-01-27 ENCOUNTER — PATIENT MESSAGE (OUTPATIENT)
Dept: PRIMARY CARE CLINIC | Facility: CLINIC | Age: 35
End: 2025-01-27
Payer: COMMERCIAL

## 2025-02-17 ENCOUNTER — OFFICE VISIT (OUTPATIENT)
Dept: OBSTETRICS AND GYNECOLOGY | Facility: CLINIC | Age: 35
End: 2025-02-17
Payer: COMMERCIAL

## 2025-02-17 VITALS
SYSTOLIC BLOOD PRESSURE: 134 MMHG | BODY MASS INDEX: 31.18 KG/M2 | WEIGHT: 194 LBS | HEIGHT: 66 IN | DIASTOLIC BLOOD PRESSURE: 85 MMHG

## 2025-02-17 DIAGNOSIS — E55.9 VITAMIN D DEFICIENCY: ICD-10-CM

## 2025-02-17 DIAGNOSIS — I10 PRIMARY HYPERTENSION: ICD-10-CM

## 2025-02-17 DIAGNOSIS — R10.2 VAGINAL PAIN: Primary | ICD-10-CM

## 2025-02-17 PROCEDURE — 99999 PR PBB SHADOW E&M-EST. PATIENT-LVL III: CPT | Mod: PBBFAC,,, | Performed by: OBSTETRICS & GYNECOLOGY

## 2025-02-17 PROCEDURE — 81515 NFCT DS BV&VAGINITIS DNA ALG: CPT | Performed by: OBSTETRICS & GYNECOLOGY

## 2025-02-17 PROCEDURE — 87591 N.GONORRHOEAE DNA AMP PROB: CPT | Performed by: OBSTETRICS & GYNECOLOGY

## 2025-02-17 PROCEDURE — 99213 OFFICE O/P EST LOW 20 MIN: CPT | Mod: PBBFAC,PN | Performed by: OBSTETRICS & GYNECOLOGY

## 2025-02-17 PROCEDURE — 3079F DIAST BP 80-89 MM HG: CPT | Mod: CPTII,,, | Performed by: OBSTETRICS & GYNECOLOGY

## 2025-02-17 PROCEDURE — 1159F MED LIST DOCD IN RCRD: CPT | Mod: CPTII,,, | Performed by: OBSTETRICS & GYNECOLOGY

## 2025-02-17 PROCEDURE — 3008F BODY MASS INDEX DOCD: CPT | Mod: CPTII,,, | Performed by: OBSTETRICS & GYNECOLOGY

## 2025-02-17 PROCEDURE — 1160F RVW MEDS BY RX/DR IN RCRD: CPT | Mod: CPTII,,, | Performed by: OBSTETRICS & GYNECOLOGY

## 2025-02-17 PROCEDURE — 3075F SYST BP GE 130 - 139MM HG: CPT | Mod: CPTII,,, | Performed by: OBSTETRICS & GYNECOLOGY

## 2025-02-17 PROCEDURE — 99213 OFFICE O/P EST LOW 20 MIN: CPT | Mod: S$PBB,,, | Performed by: OBSTETRICS & GYNECOLOGY

## 2025-02-17 RX ORDER — AMITRIPTYLINE HYDROCHLORIDE 10 MG/1
10 TABLET, FILM COATED ORAL NIGHTLY PRN
Qty: 30 TABLET | Refills: 0 | Status: SHIPPED | OUTPATIENT
Start: 2025-02-17 | End: 2026-02-17

## 2025-02-17 NOTE — PROGRESS NOTES
HISTORY OF PRESENT ILLNESS:    Monica Aparicio is a 34 y.o. female  Patient's last menstrual period was 2025 (exact date). presents today complaining of clitoral pain that began on . Described as lighting bolt intermittent pain (feels like area is being squeezed, almost pleasurable) and pain is worse with sneezing & coughing. Not swollen, no trauma, no discharge, last sexually active 7 months ago.   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     Past Medical History:   Diagnosis Date    Anemia     Asthma     Diverticulitis     Eczema     Family history of DVT     Family history of pulmonary embolism     Brother  form PE    Hypercalcemia     Internal hemorrhoid     Spinal stenosis     Vaginismus        Past Surgical History:   Procedure Laterality Date    PLEURODESIS USING TALC      UTERINE FIBROID SURGERY  2021       MEDICATIONS AND ALLERGIES:    Current Medications[1]    Review of patient's allergies indicates:   Allergen Reactions    Mold Shortness Of Breath    Tramadol Hives, Nausea And Vomiting, Rash and Shortness Of Breath    Ibuprofen Hives and Rash    Montelukast Hives, Nausea And Vomiting and Palpitations    Pork/porcine containing products Diarrhea, Itching and Rash       COMPREHENSIVE GYN HISTORY:  PAP History: Denies abnormal Paps.  Infection History: Denies STDs. Denies PID.  Benign History: Denies uterine fibroids. Denies ovarian cysts. Denies endometriosis. Denies other conditions.  Cancer History: Denies cervical cancer. Denies uterine cancer or hyperplasia. Denies ovarian cancer. Denies vulvar cancer or pre-cancer. Denies vaginal cancer or pre-cancer. Denies breast cancer. Denies colon cancer.  Sexual Activity History: Reports currently being sexually active  Menstrual History: Every 28 days, flows for 4 days. Light flow.  Dysmenorrhea History: Denies dysmenorrhea.  Contraception History:      ROS:  GENERAL: No fever or chills.  BREASTS: No pain. No lumps. No  "discharge.  ABDOMEN: No pain. No nausea. No vomiting. No diarrhea. No constipation.  REPRODUCTIVE: No abnormal bleeding.   VULVA: No pain. No lesions. No itching.  VAGINA: No relaxation. No itching. No odor. No discharge. No lesions.  URINARY: No incontinence. No nocturia. No frequency. No dysuria.    /85 (BP Location: Right arm, Patient Position: Sitting)   Ht 5' 6" (1.676 m)   Wt 88 kg (194 lb 0.1 oz)   LMP 01/31/2025 (Exact Date)   BMI 31.31 kg/m²     PE:  APPEARANCE: Well nourished, well developed, in no acute distress.  AFFECT: WNL, alert and oriented x 3.  ABDOMEN: Soft. No tenderness or masses. No hepatosplenomegaly. No hernias.  BREASTS: Symmetrical, no skin changes or visible lesions. No palpable masses, nipple discharge bilaterally.  PELVIC: Normal external female genitalia without lesions. Normal hair distribution. Adequate perineal body, normal urethral meatus. Vagina pink and well rugated without lesions or discharge. Cervix pink without lesions, discharge or tenderness. No significant cystocele or rectocele. Bimanual exam shows uterus to be 4-6 weeks size, regular, mobile and nontender. Adnexa without masses or tenderness.    PROCEDURES:    No diagnosis found.    PLAN:         COUNSELING:  The patient was counseled today on:    I spent a total of *** minutes on the day of the visit. addressing problems separate from annual exam.  This includes face to face time and non-face to face time preparing to see the patient (eg, review of tests), Obtaining and/or reviewing separately obtained history, Documenting clinical information in the electronic or other health record, Independently interpreting resultsand communicating results to the patient/family/caregiver, or Care coordination.     FOLLOW-UP with me pending test results.           [1]   Current Outpatient Medications:     albuterol (ACCUNEB) 1.25 mg/3 mL Nebu, Take 3 mLs (1.25 mg total) by nebulization every 6 (six) hours as needed. Rescue, " Disp: 75 mL, Rfl: 11    albuterol (PROAIR HFA) 90 mcg/actuation inhaler, Inhale 2 puffs into the lungs every 6 (six) hours as needed for Wheezing. Rescue, Disp: 18 g, Rfl: 11    budesonide-formoterol 160-4.5 mcg (SYMBICORT) 160-4.5 mcg/actuation HFAA, Inhale 2 puffs into the lungs every 12 (twelve) hours. Controller, Disp: 10.2 g, Rfl: 12    meloxicam (MOBIC) 7.5 MG tablet, Take 1 tablet (7.5 mg total) by mouth once daily. as directed., Disp: 30 tablet, Rfl: 3    tiZANidine (ZANAFLEX) 4 MG tablet, Take 1 tablet (4 mg total) by mouth every 8 (eight) hours as needed (menstral pain)., Disp: 30 tablet, Rfl: 3    cetirizine (ZYRTEC) 10 MG tablet, Take 1 tablet (10 mg total) by mouth once daily. (Patient not taking: Reported on 9/5/2024), Disp: 30 tablet, Rfl: 0    lactic acid-citric-potassium (PHEXXI) 1.8-1-0.4 % Gel, Place 1 applicator vaginally as needed (Right before or up to one hour before sex). (Patient not taking: Reported on 11/4/2024), Disp: 120 g, Rfl: 3

## 2025-02-19 LAB
BACTERIAL VAGINOSIS DNA: NOT DETECTED
CANDIDA GLABRATA/KRUSEI: NOT DETECTED
CANDIDA RRNA VAG QL PROBE: NOT DETECTED
TRICHOMONAS VAGINALIS: NOT DETECTED

## 2025-02-21 LAB
C TRACH DNA SPEC QL NAA+PROBE: NOT DETECTED
N GONORRHOEA DNA SPEC QL NAA+PROBE: NOT DETECTED

## 2025-04-17 ENCOUNTER — PATIENT MESSAGE (OUTPATIENT)
Dept: PRIMARY CARE CLINIC | Facility: CLINIC | Age: 35
End: 2025-04-17
Payer: MEDICAID

## 2025-04-17 DIAGNOSIS — J45.40 MODERATE PERSISTENT ASTHMA WITHOUT COMPLICATION: ICD-10-CM

## 2025-04-17 DIAGNOSIS — N92.6 MENSTRUAL DISORDER: ICD-10-CM

## 2025-04-17 RX ORDER — MELOXICAM 7.5 MG/1
7.5 TABLET ORAL DAILY
Qty: 30 TABLET | Refills: 3 | Status: CANCELLED | OUTPATIENT
Start: 2025-04-17

## 2025-04-17 RX ORDER — TIZANIDINE 4 MG/1
4 TABLET ORAL EVERY 8 HOURS PRN
Qty: 30 TABLET | Refills: 3 | Status: CANCELLED | OUTPATIENT
Start: 2025-04-17

## 2025-04-17 RX ORDER — ALBUTEROL SULFATE 1.25 MG/3ML
1.25 SOLUTION RESPIRATORY (INHALATION) EVERY 6 HOURS PRN
Qty: 75 ML | Refills: 11 | Status: CANCELLED | OUTPATIENT
Start: 2025-04-17

## 2025-04-17 NOTE — TELEPHONE ENCOUNTER
No care due was identified.  [facility] Embedded Care Due Messages. Reference number: [id number].   4/17/2025 1:28:12 PM CDT

## 2025-04-17 NOTE — TELEPHONE ENCOUNTER
Care Due:                  Date            Visit Type   Department     Provider  --------------------------------------------------------------------------------                                ESTABLISHED                              PATIENT -    [facility] PRIMARY  Last Visit: 10-      VIRTUAL      CARE           Evie Paz  Next Visit: None Scheduled  None         None Found                                                            Last  Test          Frequency    Reason                     Performed    Due Date  --------------------------------------------------------------------------------    CBC.........  12 months..  meloxicam................  09- 08-    [facility] Embedded Care Due Messages. Reference number: [id number].   4/17/2025 7:19:13 AM CDT

## 2025-04-17 NOTE — TELEPHONE ENCOUNTER
No care due was identified.  [facility] Embedded Care Due Messages. Reference number: [id number].   4/17/2025 1:30:19 PM CDT

## 2025-04-21 RX ORDER — ALBUTEROL SULFATE 1.25 MG/3ML
1.25 SOLUTION RESPIRATORY (INHALATION) EVERY 6 HOURS PRN
Qty: 75 ML | Refills: 11 | Status: SHIPPED | OUTPATIENT
Start: 2025-04-21

## 2025-04-21 RX ORDER — MELOXICAM 7.5 MG/1
7.5 TABLET ORAL
Qty: 30 TABLET | Refills: 0 | OUTPATIENT
Start: 2025-04-21

## 2025-04-21 RX ORDER — ALBUTEROL SULFATE 90 UG/1
2 INHALANT RESPIRATORY (INHALATION) EVERY 6 HOURS PRN
Qty: 18 G | Refills: 11 | Status: SHIPPED | OUTPATIENT
Start: 2025-04-21